# Patient Record
Sex: FEMALE | Race: WHITE | NOT HISPANIC OR LATINO
[De-identification: names, ages, dates, MRNs, and addresses within clinical notes are randomized per-mention and may not be internally consistent; named-entity substitution may affect disease eponyms.]

---

## 2021-03-09 ENCOUNTER — NON-APPOINTMENT (OUTPATIENT)
Age: 51
End: 2021-03-09

## 2021-03-10 ENCOUNTER — NON-APPOINTMENT (OUTPATIENT)
Age: 51
End: 2021-03-10

## 2021-03-11 ENCOUNTER — NON-APPOINTMENT (OUTPATIENT)
Age: 51
End: 2021-03-11

## 2021-03-11 ENCOUNTER — APPOINTMENT (OUTPATIENT)
Dept: CARDIOLOGY | Facility: CLINIC | Age: 51
End: 2021-03-11
Payer: COMMERCIAL

## 2021-03-11 VITALS
OXYGEN SATURATION: 97 % | DIASTOLIC BLOOD PRESSURE: 78 MMHG | WEIGHT: 256 LBS | TEMPERATURE: 98 F | HEART RATE: 87 BPM | SYSTOLIC BLOOD PRESSURE: 146 MMHG

## 2021-03-11 DIAGNOSIS — Z86.79 PERSONAL HISTORY OF OTHER DISEASES OF THE CIRCULATORY SYSTEM: ICD-10-CM

## 2021-03-11 DIAGNOSIS — J38.1 POLYP OF VOCAL CORD AND LARYNX: ICD-10-CM

## 2021-03-11 DIAGNOSIS — Z87.828 PERSONAL HISTORY OF OTHER (HEALED) PHYSICAL INJURY AND TRAUMA: ICD-10-CM

## 2021-03-11 DIAGNOSIS — Z83.49 FAMILY HISTORY OF OTHER ENDOCRINE, NUTRITIONAL AND METABOLIC DISEASES: ICD-10-CM

## 2021-03-11 DIAGNOSIS — Z86.711 PERSONAL HISTORY OF PULMONARY EMBOLISM: ICD-10-CM

## 2021-03-11 DIAGNOSIS — Z86.39 PERSONAL HISTORY OF OTHER ENDOCRINE, NUTRITIONAL AND METABOLIC DISEASE: ICD-10-CM

## 2021-03-11 DIAGNOSIS — Z82.49 FAMILY HISTORY OF ISCHEMIC HEART DISEASE AND OTHER DISEASES OF THE CIRCULATORY SYSTEM: ICD-10-CM

## 2021-03-11 DIAGNOSIS — Z86.69 PERSONAL HISTORY OF OTHER DISEASES OF THE NERVOUS SYSTEM AND SENSE ORGANS: ICD-10-CM

## 2021-03-11 DIAGNOSIS — E53.8 DEFICIENCY OF OTHER SPECIFIED B GROUP VITAMINS: ICD-10-CM

## 2021-03-11 DIAGNOSIS — Z86.19 PERSONAL HISTORY OF OTHER INFECTIOUS AND PARASITIC DISEASES: ICD-10-CM

## 2021-03-11 DIAGNOSIS — Z80.42 FAMILY HISTORY OF MALIGNANT NEOPLASM OF PROSTATE: ICD-10-CM

## 2021-03-11 PROCEDURE — 99072 ADDL SUPL MATRL&STAF TM PHE: CPT

## 2021-03-11 PROCEDURE — 93000 ELECTROCARDIOGRAM COMPLETE: CPT

## 2021-03-11 PROCEDURE — 99244 OFF/OP CNSLTJ NEW/EST MOD 40: CPT

## 2021-03-11 RX ORDER — GLUCOSAMINE/CHONDR SU A SOD 750-600 MG
TABLET ORAL
Refills: 0 | Status: ACTIVE | COMMUNITY

## 2021-03-13 PROBLEM — Z82.49 FAMILY HISTORY OF HYPERTENSION: Status: ACTIVE | Noted: 2021-03-13

## 2021-03-13 PROBLEM — Z83.49 FAMILY HISTORY OF HASHIMOTO THYROIDITIS: Status: ACTIVE | Noted: 2021-03-13

## 2021-03-13 PROBLEM — Z82.49 FAMILY HISTORY OF CONGESTIVE HEART FAILURE: Status: ACTIVE | Noted: 2021-03-13

## 2021-03-13 PROBLEM — Z86.19 HISTORY OF CANDIDIASIS OF MOUTH: Status: RESOLVED | Noted: 2021-03-13 | Resolved: 2021-03-13

## 2021-03-13 PROBLEM — Z86.69 HISTORY OF CARPAL TUNNEL SYNDROME: Status: RESOLVED | Noted: 2021-03-13 | Resolved: 2021-03-13

## 2021-03-13 PROBLEM — J38.1 VOCAL CORD POLYPS: Status: RESOLVED | Noted: 2021-03-13 | Resolved: 2021-03-13

## 2021-03-13 PROBLEM — Z80.42 FAMILY HISTORY OF MALIGNANT NEOPLASM OF PROSTATE: Status: ACTIVE | Noted: 2021-03-13

## 2021-03-13 RX ORDER — ASCORBIC ACID 500 MG
TABLET ORAL
Refills: 0 | Status: ACTIVE | COMMUNITY

## 2021-03-13 RX ORDER — CHROMIUM 200 MCG
TABLET ORAL
Refills: 0 | Status: ACTIVE | COMMUNITY

## 2021-03-13 NOTE — DISCUSSION/SUMMARY
[FreeTextEntry1] : Atrial fibrillation\par The working diagnosis is paroxysmal atrial fibrillation secondary to hypertensive-diabetic heart disease exacerbated by obesity. The history is consistent with this diagnosis. The present rate control strategy appears to be effective. An elevated "[id number]" score is indicative of an increased risk of systemic/cerebral emboli. Factor Xa. inhibitor therapy is indicated. Atrial fibrillation is expected to recur while taking the present medical regimen. The goal of treatment is control of the ventricular response and prevention of cardio embolic events.\par \par I have recommended the following\par a. Continue the present medical regimen\par b. No further cardiac testing for this problem at this time\par c. Consideration for a Zio patch/mobile telemetry study later 2021 or 2022\par \par \par Hypertension\par Hypertension has reportedly been controlled while taking the present medical regimen. In the setting of atrial fibrillation diabetes beta blocker and Ace-I./ARB therapy are attractive. The dose of nadolol  may be increased if required to maintain optimal levels.\par \par I have recommended the following\par a. Continue the present medical regimen\par b. Low salt low fat low cholesterol/triglycerides diabetic diet. Regular aerobic exercise and weight loss\par c. Home blood pressure monitoring\par d. Increased nadolol dose if required to maintain optimal levels as discussed above\par \par \par \par Hyperlipidemia\par Hyperlipidemia represents a risk factor for atherosclerotic heart disease. However there is no evidence of such to date. A stress test although not available for review at this time was reportedly normal approximately 5 years ago. The resting 3/21 electrocardiogram  showed no evidence of myocardial ischemia or infarction.. The target LDL level for primary prevention is about 100. The most recent lipid levels are not available for review. Nonpharmacological therapy specifically diet exercise and weight loss are  emphasized  as  major aspects of treatment.\par \par I have recommended the following\par a. Low salt low fat low cholesterol/triglyceride diet. Regular aerobic exercise and weight loss\par b. Continue the present medical regimen\par c. Target LDL level to about 100 as discussed above\par d. Anticipate routine laboratory studies including lipid profile through primary care Dr. Valdez in 4/21.\par \par \par Valvular heart disease\par Ms. Delgado carries a diagnosis of mitral valve prolapse in the past. However the 12/19 transthoracic echocardiogram demonstrated a structurally normal mitral valve. The left and ejection fraction 67%. The present cardiac physical examination is not suggestive of hemodynamic significant valvular pathology.\par \par I have recommended the following\par a. No further cardiac testing for this problem at this time\par \par \par \par Ventricular tachycardia\par Nonsustained ventricular tachycardia was reportedly seen on a 2014 Holter monitor. That study is not available for review. In the setting of normal left ventricular systolic function (left ventricle ejection fraction 67% 12/19 echocardiogram) the risk of the development of a malignant ventricular arrhythmia is low.\par \par I have recommended the following\par a. No further cardiac testing for this problem at this time\par b. Consideration for Zio patch/mobile telemetry study later 2020 one or 2022\par \par \par \par Obesity\par Obesity exacerbates carries cardiovascular issues. Today Ms. Delgado is 5 feet 7 inches tall and weighs 256 pounds. Diet exercise and weight loss are advised.\par \par \par The diagnosis, prognosis, risks, options and alternatives were explained at length to the patient. All questions were answered. Issues discussed included atrial fibrillation anticoagulation hypertension hyperlipidemia noninvasive cardiac testing antihypertensive agents antihyperlipidemic agents obesity diet exercise and weight loss.\par \par \par

## 2021-03-17 ENCOUNTER — TRANSCRIPTION ENCOUNTER (OUTPATIENT)
Age: 51
End: 2021-03-17

## 2021-03-18 ENCOUNTER — TRANSCRIPTION ENCOUNTER (OUTPATIENT)
Age: 51
End: 2021-03-18

## 2021-04-08 ENCOUNTER — APPOINTMENT (OUTPATIENT)
Dept: PULMONOLOGY | Facility: CLINIC | Age: 51
End: 2021-04-08

## 2021-04-12 ENCOUNTER — APPOINTMENT (OUTPATIENT)
Dept: PULMONOLOGY | Facility: CLINIC | Age: 51
End: 2021-04-12
Payer: COMMERCIAL

## 2021-04-12 VITALS
HEART RATE: 76 BPM | TEMPERATURE: 97 F | HEIGHT: 67 IN | SYSTOLIC BLOOD PRESSURE: 138 MMHG | WEIGHT: 251 LBS | BODY MASS INDEX: 39.39 KG/M2 | OXYGEN SATURATION: 97 % | DIASTOLIC BLOOD PRESSURE: 72 MMHG

## 2021-04-12 PROCEDURE — 99204 OFFICE O/P NEW MOD 45 MIN: CPT

## 2021-04-12 PROCEDURE — 99072 ADDL SUPL MATRL&STAF TM PHE: CPT

## 2021-04-12 NOTE — REVIEW OF SYSTEMS
[Recent Wt Loss (___ Lbs)] : ~T recent [unfilled] lb weight loss [Seasonal Allergies] : seasonal allergies [Nocturia] : nocturia [Anemia] : anemia [Obesity] : obesity [Negative] : Psychiatric [Fever] : no fever [Chills] : no chills [TextBox_30] : see HPI

## 2021-04-12 NOTE — PHYSICAL EXAM
[No Acute Distress] : no acute distress [Normal Oropharynx] : normal oropharynx [II] : Mallampati Class: II [Normal Appearance] : normal appearance [Neck Circumference: ___] : neck circumference: [unfilled] [No Neck Mass] : no neck mass [Normal Rate/Rhythm] : normal rate/rhythm [Normal S1, S2] : normal s1, s2 [No Resp Distress] : no resp distress [No Acc Muscle Use] : no acc muscle use [Clear to Auscultation Bilaterally] : clear to auscultation bilaterally [No Abnormalities] : no abnormalities [Benign] : benign [Normal Gait] : normal gait [No Clubbing] : no clubbing [No Cyanosis] : no cyanosis [No Edema] : no edema [Normal Turgor] : normal turgor [No Focal Deficits] : no focal deficits [Oriented x3] : oriented x3 [Normal Affect] : normal affect

## 2021-04-16 ENCOUNTER — TRANSCRIPTION ENCOUNTER (OUTPATIENT)
Age: 51
End: 2021-04-16

## 2021-04-19 ENCOUNTER — APPOINTMENT (OUTPATIENT)
Dept: INTERNAL MEDICINE | Facility: CLINIC | Age: 51
End: 2021-04-19
Payer: COMMERCIAL

## 2021-04-19 VITALS — SYSTOLIC BLOOD PRESSURE: 135 MMHG | DIASTOLIC BLOOD PRESSURE: 75 MMHG

## 2021-04-19 VITALS — HEIGHT: 67 IN | TEMPERATURE: 98 F | WEIGHT: 251 LBS | BODY MASS INDEX: 39.39 KG/M2

## 2021-04-19 DIAGNOSIS — K59.00 CONSTIPATION, UNSPECIFIED: ICD-10-CM

## 2021-04-19 DIAGNOSIS — N92.0 EXCESSIVE AND FREQUENT MENSTRUATION WITH REGULAR CYCLE: ICD-10-CM

## 2021-04-19 DIAGNOSIS — J30.9 ALLERGIC RHINITIS, UNSPECIFIED: ICD-10-CM

## 2021-04-19 PROCEDURE — 99072 ADDL SUPL MATRL&STAF TM PHE: CPT

## 2021-04-19 PROCEDURE — G0444 DEPRESSION SCREEN ANNUAL: CPT

## 2021-04-19 PROCEDURE — 99386 PREV VISIT NEW AGE 40-64: CPT | Mod: 25

## 2021-04-19 PROCEDURE — 36415 COLL VENOUS BLD VENIPUNCTURE: CPT

## 2021-04-19 NOTE — HISTORY OF PRESENT ILLNESS
[FreeTextEntry1] : physical [de-identified] : Patient is a 50F with a history of Ham Syndrome (ITP and AIHA), Hypogammaglobulinemia, Hypertension, paroxysmal atrial fibrillation, Asthma, hypertriglyceridemia, Hashimoto's thyroiditis, mild AQUILES, nephrolithiasis presents today to establish care and for annual physical\par Recently moved to [facility] from [place], currently in temporary housing until new construction is ready in october\par Moving most of her care to [place]\par \par Hematology:  Dr. Indy Esquivel @[facility]\par Immunology:  Dr. Aliya Vazquez ([facility])\par Gynecology: Dr. Lacie Santamaria [facility] ([place]) PAP 6/2020, Mammo 2019 NEXT in July 2021\par Weight loss specialist: Dr. Meka Renae ([facility])\par Ophthalmology: Dr. Constance Hylton [facility], would like ophthalmology referral\par Urology: Dr. Kyle Cartagena - sees every 6 month for xrays, would like urology referral\par Dermatology: Dr. Kyle James [facility], would like dermatology referral\par Pulmonology: Dr. Sanchez \par Cardiology: Dr. Arshad\par Ortho: Dr. Shen Zuñiga\par \par \par \par \par \par \par

## 2021-04-19 NOTE — HEALTH RISK ASSESSMENT
[Yes] : Yes [Monthly or less (1 pt)] : Monthly or less (1 point) [1 or 2 (0 pts)] : 1 or 2 (0 points) [Never (0 pts)] : Never (0 points) [No] : In the past 12 months have you used drugs other than those required for medical reasons? No [No falls in past year] : Patient reported no falls in the past year [0] : 2) Feeling down, depressed, or hopeless: Not at all (0) [Patient reported mammogram was normal] : Patient reported mammogram was normal [Patient reported PAP Smear was normal] : Patient reported PAP Smear was normal [HIV test declined] : HIV test declined [Hepatitis C test offered] : Hepatitis C test offered [Alone] : lives alone [# of Members in Household ___] :  household currently consist of [unfilled] member(s) [Retired] : retired [Graduate School] : graduate school [Single] : single [# Of Children ___] : has [unfilled] children [Feels Safe at Home] : Feels safe at home [Reports normal functional visual acuity (ie: able to read med bottle)] : Reports normal functional visual acuity [Smoke Detector] : smoke detector [Carbon Monoxide Detector] : carbon monoxide detector [Seat Belt] :  uses seat belt [Sunscreen] : uses sunscreen [With Patient/Caregiver] : With Patient/Caregiver [Patient reported colonoscopy was normal] : Patient reported colonoscopy was normal [de-identified] : rare [] : No [Audit-CScore] : 1 [de-identified] : elliptical  4-5 x week ,walks  [de-identified] : well balanced  [[id number]] : 0 [Reports changes in hearing] : Reports no changes in hearing [Reports changes in vision] : Reports no changes in vision [Reports changes in dental health] : Reports no changes in dental health [Guns at Home] : no guns at home [Safety elements used in home] : no safety elements used in home [Travel to Developing Areas] : does not  travel to developing areas [Caregiver Concerns] : does not have caregiver concerns [MammogramDate] : 7/3/2019 [MammogramComments] : [facility] [PapSmearDate] : 7/30/2020 [BoneDensityDate] : never [PapSmearComments] : Dr. Lacie Santamaria [ColonoscopyDate] : 02/2020 [FreeTextEntry2] :  (self employed) [de-identified] : has cleaning 4/20/21 [de-identified] : last exam 10/2020 [AdvancecareDate] : 4/2021

## 2021-04-20 ENCOUNTER — TRANSCRIPTION ENCOUNTER (OUTPATIENT)
Age: 51
End: 2021-04-20

## 2021-04-20 PROBLEM — N92.0 MENORRHAGIA: Status: ACTIVE | Noted: 2021-04-20

## 2021-04-20 PROBLEM — K59.00 CONSTIPATION: Status: ACTIVE | Noted: 2021-04-20

## 2021-04-20 LAB
25(OH)D3 SERPL-MCNC: 35.5 NG/ML
ALBUMIN SERPL ELPH-MCNC: 4.6 G/DL
ALP BLD-CCNC: 56 U/L
ALT SERPL-CCNC: 22 U/L
ANION GAP SERPL CALC-SCNC: 11 MMOL/L
AST SERPL-CCNC: 27 U/L
BASOPHILS # BLD AUTO: 0.11 K/UL
BASOPHILS NFR BLD AUTO: 1.1 %
BILIRUB SERPL-MCNC: 0.3 MG/DL
BUN SERPL-MCNC: 13 MG/DL
CALCIUM SERPL-MCNC: 10.5 MG/DL
CHLORIDE SERPL-SCNC: 103 MMOL/L
CHOLEST SERPL-MCNC: 163 MG/DL
CO2 SERPL-SCNC: 24 MMOL/L
COVID-19 NUCLEOCAPSID  GAM ANTIBODY INTERPRETATION: NEGATIVE
COVID-19 SPIKE DOMAIN ANTIBODY INTERPRETATION: POSITIVE
CREAT SERPL-MCNC: 0.75 MG/DL
EOSINOPHIL # BLD AUTO: 0.48 K/UL
EOSINOPHIL NFR BLD AUTO: 4.9 %
ESTIMATED AVERAGE GLUCOSE: 97 MG/DL
GLUCOSE SERPL-MCNC: 77 MG/DL
HBA1C MFR BLD HPLC: 5 %
HCT VFR BLD CALC: 40.8 %
HDLC SERPL-MCNC: 48 MG/DL
HGB BLD-MCNC: 12.7 G/DL
IMM GRANULOCYTES NFR BLD AUTO: 0.3 %
LDLC SERPL CALC-MCNC: 89 MG/DL
LYMPHOCYTES # BLD AUTO: 2.29 K/UL
LYMPHOCYTES NFR BLD AUTO: 23.2 %
MAN DIFF?: NORMAL
MCHC RBC-ENTMCNC: 29.2 PG
MCHC RBC-ENTMCNC: 31.1 GM/DL
MCV RBC AUTO: 93.8 FL
MONOCYTES # BLD AUTO: 0.85 K/UL
MONOCYTES NFR BLD AUTO: 8.6 %
NEUTROPHILS # BLD AUTO: 6.1 K/UL
NEUTROPHILS NFR BLD AUTO: 61.9 %
NONHDLC SERPL-MCNC: 116 MG/DL
PLATELET # BLD AUTO: 420 K/UL
POTASSIUM SERPL-SCNC: 4.3 MMOL/L
PROT SERPL-MCNC: 6.8 G/DL
RBC # BLD: 4.35 M/UL
RBC # FLD: 14.1 %
SARS-COV-2 AB SERPL IA-ACNC: >250 U/ML
SARS-COV-2 AB SERPL QL IA: 0.1 INDEX
SODIUM SERPL-SCNC: 139 MMOL/L
T3FREE SERPL-MCNC: 2.73 PG/ML
T4 FREE SERPL-MCNC: 1.6 NG/DL
THYROGLOB AB SERPL-ACNC: <20 IU/ML
THYROPEROXIDASE AB SERPL IA-ACNC: 87.5 IU/ML
TRIGL SERPL-MCNC: 135 MG/DL
TSH SERPL-ACNC: 2.22 UIU/ML
WBC # FLD AUTO: 9.86 K/UL

## 2021-04-20 RX ORDER — DOCUSATE SODIUM 100 MG
100 TABLET ORAL DAILY
Qty: 90 | Refills: 3 | Status: ACTIVE | COMMUNITY
Start: 2021-04-20

## 2021-04-20 RX ORDER — PYRIDOXINE HCL (VITAMIN B6) 100 MG
100 TABLET ORAL TWICE DAILY
Refills: 0 | Status: ACTIVE | COMMUNITY

## 2021-04-20 RX ORDER — ALBUTEROL SULFATE 90 UG/1
108 AEROSOL, METERED RESPIRATORY (INHALATION)
Refills: 0 | Status: DISCONTINUED | COMMUNITY
End: 2021-04-20

## 2021-04-20 RX ORDER — BECLOMETHASONE DIPROPIONATE HFA 80 UG/1
80 AEROSOL, METERED RESPIRATORY (INHALATION)
Refills: 0 | Status: DISCONTINUED | COMMUNITY
End: 2021-04-20

## 2021-04-20 RX ORDER — IMMUNE GLOBULIN SUBCUTANEOUS, HUMAN-KLHW 200 MG/ML
2 SOLUTION SUBCUTANEOUS WEEKLY
Refills: 0 | Status: ACTIVE | COMMUNITY
Start: 2021-02-16

## 2021-04-20 RX ORDER — FLUTICASONE PROPIONATE 50 UG/1
50 SPRAY, METERED NASAL TWICE DAILY
Qty: 1 | Refills: 6 | Status: ACTIVE | COMMUNITY
Start: 2021-04-20

## 2021-04-20 RX ORDER — LORATADINE 10 MG/1
10 CAPSULE, LIQUID FILLED ORAL DAILY
Qty: 90 | Refills: 3 | Status: ACTIVE | COMMUNITY
Start: 2021-04-20

## 2021-04-20 RX ORDER — OMEGA-3/DHA/EPA/FISH OIL 35-113.5MG
1000 TABLET,CHEWABLE ORAL DAILY
Refills: 0 | Status: ACTIVE | COMMUNITY

## 2021-04-20 RX ORDER — FENOFIBRATE 150 MG/1
150 CAPSULE ORAL
Refills: 0 | Status: DISCONTINUED | COMMUNITY
End: 2021-04-20

## 2021-04-21 ENCOUNTER — TRANSCRIPTION ENCOUNTER (OUTPATIENT)
Age: 51
End: 2021-04-21

## 2021-05-05 ENCOUNTER — TRANSCRIPTION ENCOUNTER (OUTPATIENT)
Age: 51
End: 2021-05-05
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
## 2021-05-27 ENCOUNTER — TRANSCRIPTION ENCOUNTER (OUTPATIENT)
Age: 51
End: 2021-05-27

## 2021-06-14 ENCOUNTER — APPOINTMENT (OUTPATIENT)
Dept: PULMONOLOGY | Facility: CLINIC | Age: 51
End: 2021-06-14

## 2021-06-16 ENCOUNTER — NON-APPOINTMENT (OUTPATIENT)
Age: 51
End: 2021-06-16

## 2021-06-17 ENCOUNTER — NON-APPOINTMENT (OUTPATIENT)
Age: 51
End: 2021-06-17

## 2021-06-17 ENCOUNTER — APPOINTMENT (OUTPATIENT)
Dept: GASTROENTEROLOGY | Facility: CLINIC | Age: 51
End: 2021-06-17
Payer: COMMERCIAL

## 2021-06-17 VITALS
BODY MASS INDEX: 39.24 KG/M2 | HEART RATE: 74 BPM | HEIGHT: 67 IN | TEMPERATURE: 97.7 F | SYSTOLIC BLOOD PRESSURE: 128 MMHG | DIASTOLIC BLOOD PRESSURE: 86 MMHG | WEIGHT: 250 LBS | OXYGEN SATURATION: 98 %

## 2021-06-17 PROCEDURE — 99072 ADDL SUPL MATRL&STAF TM PHE: CPT

## 2021-06-17 PROCEDURE — 99243 OFF/OP CNSLTJ NEW/EST LOW 30: CPT

## 2021-06-17 RX ORDER — OMEGA-3/DHA/EPA/FISH OIL 300-1000MG
CAPSULE ORAL
Refills: 0 | Status: DISCONTINUED | COMMUNITY
End: 2021-06-17

## 2021-06-17 RX ORDER — DOXYCYCLINE 100 MG/1
100 CAPSULE ORAL
Qty: 2 | Refills: 0 | Status: DISCONTINUED | COMMUNITY
Start: 2021-05-24 | End: 2021-06-17

## 2021-06-17 NOTE — REASON FOR VISIT
[Consultation] : a consultation visit [FreeTextEntry1] : Patient seen at the request of Dr. Demi Valdez for the evaluation of colon cancer screening.

## 2021-06-17 NOTE — HISTORY OF PRESENT ILLNESS
[FreeTextEntry1] : 50 year old F PMH Ham Sx- ITP and AIHA, hypogammaglobulinemia/h/o multiple infections, SC IVIG, htn, h/o PE,  pAfib on Xarelto, asthma, hyperTAG, Hashimoto's thyroiditis, mild AQUILES, h/o nephrolithiasis , abnormal uterine bleeding, s/p D&C 2015, ortho surgeries. hyper PTH, s/p parathyroidectomy, presents for evaluation of colon cancer screening. \par \par She sees multiple physician- Hematology, immunology, gyn, weight loss specialist, Ophtho, urology, derm, pulmonary, cardiology, ortho\par \par Negative cologuard  02/2020\par \par No prior colonoscopy\par \par Patient denies abdominal pain , n/v/heartburn, reflux, dysphagia/odynophagia, change in bowel habits, diarrhea, constipation, brbpr, melena. no weight loss.  Good appetite. Patient has daily BM, denies regular NSAID use. \par \par normal CBC/CMET 04/2021\par \par father- h/o colon polyps, nonadenomatous\par mother- no known polyps\par \par no known colon cancer

## 2021-06-17 NOTE — PHYSICAL EXAM
[General Appearance - Alert] : alert [General Appearance - In No Acute Distress] : in no acute distress [Sclera] : the sclera and conjunctiva were normal [Outer Ear] : the ears and nose were normal in appearance [Hearing Threshold Finger Rub Not [place]] : hearing was normal [Neck Appearance] : the appearance of the neck was normal [] : no respiratory distress [Apical Impulse] : the apical impulse was normal [Abdomen Soft] : soft [Abdomen Tenderness] : non-tender [Abnormal Walk] : normal gait [Skin Color & Pigmentation] : normal skin color and pigmentation [Oriented To Time, Place, And Person] : oriented to person, place, and time [FreeTextEntry1] : deferred

## 2021-06-17 NOTE — ASSESSMENT
[FreeTextEntry1] : Colon cancer screening\par -Negative cologuard test in 02/2020. \par -Discussed cologuard test results in detail. Discussed risks (including but not limited to sedation risks, infection, bleeding, perforation, possibility of missed lesions),  benefits and alternatives to colonoscopy, including not doing the procedure.\par -patient understood and agreed to repeat cologuard testing in 02/2023. \par -she will call if any questions or concerns prior to this time. \par

## 2021-08-30 ENCOUNTER — APPOINTMENT (OUTPATIENT)
Dept: PULMONOLOGY | Facility: CLINIC | Age: 51
End: 2021-08-30
Payer: COMMERCIAL

## 2021-08-30 VITALS
OXYGEN SATURATION: 98 % | BODY MASS INDEX: 39.24 KG/M2 | TEMPERATURE: 98 F | WEIGHT: 250 LBS | HEIGHT: 67 IN | HEART RATE: 75 BPM | SYSTOLIC BLOOD PRESSURE: 140 MMHG | DIASTOLIC BLOOD PRESSURE: 90 MMHG

## 2021-08-30 PROCEDURE — 99214 OFFICE O/P EST MOD 30 MIN: CPT

## 2021-08-30 NOTE — REVIEW OF SYSTEMS
[Seasonal Allergies] : seasonal allergies [Nocturia] : nocturia [Obesity] : obesity [Negative] : Neurologic

## 2021-08-30 NOTE — HISTORY OF PRESENT ILLNESS
[TextBox_4] : 50 year old female with asthma and AQUILES came for f/u.\par Last seen in April 2021\par She is on Auto PAP 4-20 cm h2O\par Compliance report\par Usage 77%\par Average use 4 hrs 56 min\par 95% pressure 6.5\par Leak 6\par AHI 1\par \par She is using Q kris only 1 inh daily with no symptoms.  No exacerbations.\par Does not use Albuterol as all. No night symptoms.\par \par She uses CPAP almost every night.\par Does not feels any difference in sleep with PAP.\par She sleeps well with PAP.\par Has nocturia once per night.\par Has a good mask fit. Has a face mask. No leaks.\par \par \par \par

## 2021-08-30 NOTE — PHYSICAL EXAM
[No Acute Distress] : no acute distress [Normal Rate/Rhythm] : normal rate/rhythm [Normal Appearance] : normal appearance [Normal S1, S2] : normal s1, s2 [No Resp Distress] : no resp distress [Clear to Auscultation Bilaterally] : clear to auscultation bilaterally [No Abnormalities] : no abnormalities [No Cyanosis] : no cyanosis [No Clubbing] : no clubbing [No Focal Deficits] : no focal deficits [Oriented x3] : oriented x3 [Normal Affect] : normal affect

## 2021-09-09 ENCOUNTER — APPOINTMENT (OUTPATIENT)
Dept: CARDIOLOGY | Facility: CLINIC | Age: 51
End: 2021-09-09
Payer: COMMERCIAL

## 2021-09-09 VITALS
BODY MASS INDEX: 39.24 KG/M2 | HEART RATE: 78 BPM | SYSTOLIC BLOOD PRESSURE: 149 MMHG | TEMPERATURE: 98.6 F | WEIGHT: 250 LBS | DIASTOLIC BLOOD PRESSURE: 82 MMHG | HEIGHT: 67 IN | OXYGEN SATURATION: 98 %

## 2021-09-09 DIAGNOSIS — Z87.09 PERSONAL HISTORY OF OTHER DISEASES OF THE RESPIRATORY SYSTEM: ICD-10-CM

## 2021-09-09 DIAGNOSIS — Z86.39 PERSONAL HISTORY OF OTHER ENDOCRINE, NUTRITIONAL AND METABOLIC DISEASE: ICD-10-CM

## 2021-09-09 DIAGNOSIS — Z87.442 PERSONAL HISTORY OF URINARY CALCULI: ICD-10-CM

## 2021-09-09 DIAGNOSIS — Z86.69 PERSONAL HISTORY OF OTHER DISEASES OF THE NERVOUS SYSTEM AND SENSE ORGANS: ICD-10-CM

## 2021-09-09 DIAGNOSIS — Z86.2 PERSONAL HISTORY OF DISEASES OF THE BLOOD AND BLOOD-FORMING ORGANS AND CERTAIN DISORDERS INVOLVING THE IMMUNE MECHANISM: ICD-10-CM

## 2021-09-09 DIAGNOSIS — M72.2 PLANTAR FASCIAL FIBROMATOSIS: ICD-10-CM

## 2021-09-09 DIAGNOSIS — Z86.79 PERSONAL HISTORY OF OTHER DISEASES OF THE CIRCULATORY SYSTEM: ICD-10-CM

## 2021-09-09 PROCEDURE — 93000 ELECTROCARDIOGRAM COMPLETE: CPT

## 2021-09-09 PROCEDURE — 99214 OFFICE O/P EST MOD 30 MIN: CPT

## 2021-09-10 ENCOUNTER — NON-APPOINTMENT (OUTPATIENT)
Age: 51
End: 2021-09-10

## 2021-09-10 PROBLEM — Z86.39 HISTORY OF HYPOTHYROIDISM: Status: RESOLVED | Noted: 2021-09-10 | Resolved: 2021-09-10

## 2021-09-10 PROBLEM — Z86.2 HISTORY OF HEMOLYTIC ANEMIA: Status: RESOLVED | Noted: 2021-09-10 | Resolved: 2021-09-10

## 2021-09-10 PROBLEM — Z86.39 HISTORY OF OBESITY: Status: RESOLVED | Noted: 2021-09-10 | Resolved: 2021-09-10

## 2021-09-10 PROBLEM — Z86.79 HISTORY OF HYPERTENSION: Status: RESOLVED | Noted: 2021-09-10 | Resolved: 2021-09-10

## 2021-09-10 PROBLEM — Z87.442 HISTORY OF KIDNEY STONES: Status: RESOLVED | Noted: 2021-09-10 | Resolved: 2021-09-10

## 2021-09-10 PROBLEM — Z87.09 HISTORY OF ASTHMA: Status: RESOLVED | Noted: 2021-09-10 | Resolved: 2021-09-10

## 2021-09-10 PROBLEM — Z86.69 HISTORY OF SLEEP APNEA: Status: RESOLVED | Noted: 2021-09-10 | Resolved: 2021-09-10

## 2021-09-10 PROBLEM — M72.2 PLANTAR FASCIITIS: Status: RESOLVED | Noted: 2021-03-13 | Resolved: 2021-09-10

## 2021-09-10 PROBLEM — Z86.2 HISTORY OF IDIOPATHIC THROMBOCYTOPENIC PURPURA: Status: RESOLVED | Noted: 2021-09-10 | Resolved: 2021-09-10

## 2021-09-10 NOTE — HISTORY OF PRESENT ILLNESS
[FreeTextEntry1] : 50-year-old female\par Routine followup\par "I feel okay."  Patricia denies chest pain, sustained palpitations shortness of breath at rest or syncope. She is physically active exercising regularly without restriction or limitation. Occasional fleeting extrasystoles.

## 2021-09-10 NOTE — REVIEW OF SYSTEMS
[Joint Pain] : joint pain [Negative] : Psychiatric [FreeTextEntry5] : see history of present illness

## 2021-09-10 NOTE — DISCUSSION/SUMMARY
[FreeTextEntry1] : Atrial fibrillation\par The working diagnosis is paroxysmal atrial fibrillation secondary to hypertensive heart disease exacerbated by obesity. The history is consistent with this diagnosis. The present rate control strategy appears to be effective. An elevated "[id number]" score is indicative of an increased risk of systemic/cerebral emboli. Factor Xa. inhibitor therapy is indicated. Atrial fibrillation is expected to recur while taking the present medical regimen. The goal of treatment is control of the ventricular response and prevention of cardio embolic events.\par \par I have recommended the following\par a. Continue the present medical regimen\par b. No further cardiac testing for this problem at this time\par c. Consideration for a Zio patch/mobile telemetry study   2022\par \par \par \par \par Hypertension\par Hypertension has reportedly been controlled while taking the present medical regimen. In the setting of atrial fibrillation diabetes beta blocker and Ace-I./ARB therapy are attractive. The dose of nadolol  may be increased if required to maintain optimal levels.\par \par I have recommended the following\par a. Continue the present medical regimen\par b. Low salt low fat low cholesterol/triglycerides diabetic diet. Regular aerobic exercise and weight loss\par c. Home blood pressure monitoring\par d. Increase  nadolol dose if required to maintain optimal levels as discussed above\par \par \par \par Hyperlipidemia\par Hyperlipidemia represents a risk factor for atherosclerotic heart disease. However there is no evidence of such to date. A stress test echocardiogram in 4/14 was normal . The resting 9/21 electrocardiogram  showed no evidence of myocardial ischemia or infarction.. The target LDL level for primary prevention is about 100.   The present medical regimen has been effective. In 4/21 serum cholesterol level was 163 triglycerides 135 HDL 48 and LDL 89. Nonpharmacological therapy specifically diet exercise and weight loss are  emphasized  as  major aspects of treatment.\par \par I have recommended the following\par a. Low salt low fat low cholesterol/triglyceride diet. Regular aerobic exercise and weight loss\par b. Continue the present medical regimen\par c. Target LDL level to about 100 as discussed above\par d  Stress treadmill ECG study\par \par \par \par Valvular heart disease\par Ms. Delgado carries a diagnosis of mitral valve prolapse in the past. However the 12/19 transthoracic echocardiogram demonstrated a structurally normal mitral valve. The left and ejection fraction 67%. The present cardiac physical examination is not suggestive of hemodynamic significant valvular pathology.\par Echocardiography would be helpful to reassess left ventricular and valvular function.\par \par I have recommended the following\par a. Echocardiogram\par \par \par \par Ventricular tachycardia\par Nonsustained ventricular tachycardia was reportedly seen on a 2014 Holter monitor. That study is not available for review. In the setting of normal left ventricular systolic function (left ventricle ejection fraction 67% 12/19 echocardiogram) the risk of the development of a malignant ventricular arrhythmia is low.\par \par I have recommended the following\par a.  echocardiogram\par b. consideration for Zio patch/mobile telemetry study  2022\par \par \par \par Obesity\par Obesity exacerbates carries cardiovascular issues. Today Ms. Delgado is 5 feet 7 inches tall and weighs 250 pounds. Diet exercise and weight loss are advised.\par \par \par The diagnosis, prognosis, risks, options and alternatives were explained at length to the patient. All questions were answered. Issues discussed included atrial fibrillation anticoagulation hypertension hyperlipidemia noninvasive cardiac testing antihypertensive agents antihyperlipidemic agents obesity diet exercise and weight loss.\par \par \par Counseling and/or coordination of care\par Time was a significant factor for this patient encounter. Total time spent with the patient was 30 minutes. 50% of the time was devoted to counseling and/or coordination of care\par \par

## 2021-09-28 ENCOUNTER — APPOINTMENT (OUTPATIENT)
Dept: CARDIOLOGY | Facility: CLINIC | Age: 51
End: 2021-09-28

## 2021-10-01 ENCOUNTER — APPOINTMENT (OUTPATIENT)
Dept: FAMILY MEDICINE | Facility: CLINIC | Age: 51
End: 2021-10-01
Payer: COMMERCIAL

## 2021-10-01 VITALS
HEIGHT: 67 IN | TEMPERATURE: 98.2 F | HEART RATE: 83 BPM | SYSTOLIC BLOOD PRESSURE: 138 MMHG | RESPIRATION RATE: 16 BRPM | WEIGHT: 250 LBS | BODY MASS INDEX: 39.24 KG/M2 | OXYGEN SATURATION: 98 % | DIASTOLIC BLOOD PRESSURE: 74 MMHG

## 2021-10-01 DIAGNOSIS — Z12.39 ENCOUNTER FOR OTHER SCREENING FOR MALIGNANT NEOPLASM OF BREAST: ICD-10-CM

## 2021-10-01 DIAGNOSIS — B37.2 CANDIDIASIS OF SKIN AND NAIL: ICD-10-CM

## 2021-10-01 DIAGNOSIS — Z11.59 ENCOUNTER FOR SCREENING FOR OTHER VIRAL DISEASES: ICD-10-CM

## 2021-10-01 PROCEDURE — 99213 OFFICE O/P EST LOW 20 MIN: CPT

## 2021-10-04 PROBLEM — Z12.39 SCREENING FOR BREAST CANCER: Status: RESOLVED | Noted: 2021-04-19 | Resolved: 2021-10-04

## 2021-10-04 PROBLEM — B37.2 CANDIDAL INTERTRIGO: Status: ACTIVE | Noted: 2021-10-01

## 2021-10-04 NOTE — HISTORY OF PRESENT ILLNESS
[FreeTextEntry8] : Pt is a 49 y/o F that presents to clinic c/o rash under her breasts and abdominal panus. Says it occurs intermittently but was much worse recently. She tried using over the counter steroid medications but it didn't help. It is itchy and slightly painful.

## 2021-10-04 NOTE — PHYSICAL EXAM
[Normal] : normal rate, regular rhythm, normal S1 and S2 and no murmur heard [de-identified] : erythematous macular rash with areas of sloughed off skin on abdominal pannus, underneath breasts and in groin folds.

## 2021-10-11 ENCOUNTER — APPOINTMENT (OUTPATIENT)
Dept: CARDIOLOGY | Facility: CLINIC | Age: 51
End: 2021-10-11

## 2021-10-17 ENCOUNTER — RESULT REVIEW (OUTPATIENT)
Age: 51
End: 2021-10-17

## 2021-10-20 ENCOUNTER — NON-APPOINTMENT (OUTPATIENT)
Age: 51
End: 2021-10-20

## 2021-11-15 ENCOUNTER — APPOINTMENT (OUTPATIENT)
Dept: PODIATRY | Facility: CLINIC | Age: 51
End: 2021-11-15
Payer: COMMERCIAL

## 2021-11-15 ENCOUNTER — NON-APPOINTMENT (OUTPATIENT)
Age: 51
End: 2021-11-15

## 2021-11-15 VITALS — HEIGHT: 67 IN | BODY MASS INDEX: 38.45 KG/M2 | WEIGHT: 245 LBS

## 2021-11-15 PROCEDURE — 99203 OFFICE O/P NEW LOW 30 MIN: CPT

## 2021-11-15 PROCEDURE — 73630 X-RAY EXAM OF FOOT: CPT | Mod: RT

## 2021-11-15 PROCEDURE — L3000: CPT | Mod: RT

## 2021-11-15 PROCEDURE — 73630 X-RAY EXAM OF FOOT: CPT | Mod: LT

## 2021-11-15 RX ORDER — NYSTATIN 100000 [USP'U]/G
100000 POWDER TOPICAL
Qty: 1 | Refills: 0 | Status: DISCONTINUED | COMMUNITY
Start: 2021-10-01 | End: 2021-11-15

## 2021-11-15 RX ORDER — KETOCONAZOLE 20 MG/G
2 CREAM TOPICAL TWICE DAILY
Qty: 1 | Refills: 0 | Status: DISCONTINUED | COMMUNITY
Start: 2021-10-01 | End: 2021-11-15

## 2021-11-15 RX ORDER — MULTIVITAMIN
TABLET ORAL
Refills: 0 | Status: DISCONTINUED | COMMUNITY
End: 2021-11-15

## 2021-11-15 NOTE — HISTORY OF PRESENT ILLNESS
[FreeTextEntry1] : Right foot\par Location: below the 2nd toe\par Duration: 3 months\par Acute: yes\par Chronic:\par Past Tx: none\par Exacerbated by:exercise\par Prior Hx:no\par \par Left Foot:\par Location: plantar aspect of the 1st met (Hx of left heel pain as well)\par Duration:3 months\par Acute:yes since walking more to lose weight\par Past Tx: none \par Exacerbated by: worse on days of exercise\par Prior Hx:\par \par

## 2021-11-15 NOTE — REVIEW OF SYSTEMS
[As Noted in HPI] : as noted in HPI [Easy Bleeding] : a tendency for easy bleeding [Easy Bruising] : a tendency for easy bruising [Negative] : Integumentary [Arthralgias] : no arthralgias [Joint Swelling] : no joint swelling [Joint Stiffness] : no joint stiffness [Limb Pain] : no limb pain [Limb Swelling] : no limb swelling [FreeTextEntry6] : Hx of PE,on AC

## 2021-11-15 NOTE — PHYSICAL EXAM
[General Appearance - Alert] : alert [General Appearance - In No Acute Distress] : in no acute distress [Sensation] : the sensory exam was normal to light touch and pinprick [No Focal Deficits] : no focal deficits [Deep Tendon Reflexes (DTR)] : deep tendon reflexes were 2+ and symmetric [Motor Exam] : the motor exam was normal [Oriented To Time, Place, And Person] : oriented to person, place, and time [Impaired Insight] : insight and judgment were intact [Affect] : the affect was normal [FreeTextEntry3] : Vascular exam reveals palpable pedal pulses, the foot is warm to touch, there was good capillary fill time, the skin is normal in appearance there is no evidence of vascular disease or compromise at this time [de-identified] : right foot\par s and sx c/w LMB, pain sub 2nd met head and MTP joint w/ flattening of 2nd met head\par left foot:\par s/p tibial sesamoidectomy, pain sub 1st met head area

## 2021-11-15 NOTE — PROCEDURE
[FreeTextEntry1] : X-rays were taken in the office multiple views right foot\par X-rays were taken in the office multiple views of the left foot\par s/p tibial sesamoidectomy left foot, early friebergs right\par \par had a lengthy discussion with the patient regarding the diagnosis etiology and differential diagnosis as well as treatment options for the presenting problem. Risks alternatives and benefits of treatment ranging from conservative to surgical explained in great detail. I also explained the progression of treatment from conservative to possible surgical treatment options as well as the benefits of each. I do stress conservative treatment if in fact conservative treatment is an option until it no longer provides relief. Over-the-counter products medications padding, and splinting were reviewed as well. All questions asked and answered appropriately to the patient's satisfaction\par \par I have applied offloading pads to the patient's foot and have given them several samples to continue to use. I have also advised the patient that they can certainly purchase these from an outside vendor and if they are willing to do that and the name and number was supplied\par \par Evaluation of her orthotics reveal a complete loss of support in the LA and rearfoot post breakdown/. In my opinion, she would benefit from a new pair of FFO.\par \par During the evaluation and management I had a lengthy discussion with the patient regarding benefits of functional foot orthoses. I explained to the patient the etiology and treatment options and one of them included the offloading and balancing of the painful portion of the foot. I explained the importance of balancing in offloading the painful area as part of the overall treatment process to advance healing. I have asked the patient to consider this as part of the treatment\par Will get insurance authorization\par \par I have applied offloading pads to the patient's foot and have given them several samples to continue to use. I have also advised the patient that they can certainly purchase these from an outside vendor and if they are willing to do that and the name and number was supplied\par \par The patient was advised formal physical therapy is critical at this point and that I recommend it in order to try and achieve best possible outcome to their problem. Rx has been supplied.\par \par follow up appt 4 weeks\par \par

## 2021-11-16 ENCOUNTER — TRANSCRIPTION ENCOUNTER (OUTPATIENT)
Age: 51
End: 2021-11-16

## 2021-11-17 ENCOUNTER — APPOINTMENT (OUTPATIENT)
Dept: BARIATRICS/WEIGHT MGMT | Facility: CLINIC | Age: 51
End: 2021-11-17
Payer: COMMERCIAL

## 2021-11-17 VITALS — WEIGHT: 245.8 LBS | BODY MASS INDEX: 38.5 KG/M2

## 2021-11-17 PROCEDURE — 99204 OFFICE O/P NEW MOD 45 MIN: CPT | Mod: 95

## 2021-11-17 NOTE — CONSULT LETTER
[Dear  ___] : Dear  [unfilled], [Consult Letter:] : I had the pleasure of evaluating your patient, [unfilled]. [( Thank you for referring [unfilled] for consultation for _____ )] : Thank you for referring [unfilled] for consultation for [unfilled] [Please see my note below.] : Please see my note below. [Consult Closing:] : Thank you very much for allowing me to participate in the care of this patient.  If you have any questions, please do not hesitate to contact me. [Sincerely,] : Sincerely, [FreeTextEntry3] : Areli Garsia FNP-BC

## 2021-11-17 NOTE — REASON FOR VISIT
[Other Location: e.g. School (Enter Location, City,State)___] : at [unfilled], at the time of the visit. [Other Location: e.g. Home (Enter Location, City,State)___] : at [unfilled] [Initial Consultation] : an initial consultation for

## 2021-11-17 NOTE — ASSESSMENT
[FreeTextEntry1] : 50 year old female with class II obesity c/b HTN, AQUILES, HPL\par Patient can continue current weight loss medications- discussed metformin and Ozempic are both off label as she does not have diabetes.  Refilled prescriptions for metformin ER 1000mg daily, Contrave 4 tablets/day, will increase Ozempic to 1mg/week- discussed possible side effects patient to call me if she feels unwell on higher dose\par Will start tracking in [place]- will refer to RD\par Committed to exercise 4-5x a week\par F/U 1 month

## 2021-11-17 NOTE — HISTORY OF PRESENT ILLNESS
[FreeTextEntry1] : 50 year old female referred for medical weight loss consultation.\par Patient has struggled with her weight since childhood.  Has tried many diets including WW, atkins.  Both parents struggle with their weight.  Her sister is an RD, MALVIN \yvon Was seeing Dr. Bryan at [place] for medical weight loss- currently taking metformin ER 500mg BID, ozempic 0.5mg/week (no known history of diabetes or prediabetes HbA1C 5.0 4/2021)\yvon Has treadmill and bike in her hotel room.  Going to live there for 1 year until construction on her new [employer] is done\yvon Lost her job at [employer] during the pandemic- going to be switching insurance soon\par Breakfast daily at [employer]- switched from bagels/waffles to yogurt and eggs\yvon Wears CPAP for AQUILES \par Highest weight 5 years ago 277, current weight 245.8\par Good water intake\yvon Struggling to meet people in [place]- doing [employer] events \par Immunocompromised- UTD with COVID vaccination \par Not interested in bariatric surgery

## 2021-11-24 ENCOUNTER — APPOINTMENT (OUTPATIENT)
Dept: CARDIOLOGY | Facility: CLINIC | Age: 51
End: 2021-11-24
Payer: COMMERCIAL

## 2021-11-24 ENCOUNTER — APPOINTMENT (OUTPATIENT)
Dept: INTERNAL MEDICINE | Facility: CLINIC | Age: 51
End: 2021-11-24
Payer: COMMERCIAL

## 2021-11-24 ENCOUNTER — MED ADMIN CHARGE (OUTPATIENT)
Age: 51
End: 2021-11-24

## 2021-11-24 PROCEDURE — 93015 CV STRESS TEST SUPVJ I&R: CPT

## 2021-11-24 PROCEDURE — 90686 IIV4 VACC NO PRSV 0.5 ML IM: CPT

## 2021-11-24 PROCEDURE — G0008: CPT

## 2021-12-09 ENCOUNTER — TRANSCRIPTION ENCOUNTER (OUTPATIENT)
Age: 51
End: 2021-12-09

## 2021-12-14 ENCOUNTER — TRANSCRIPTION ENCOUNTER (OUTPATIENT)
Age: 51
End: 2021-12-14

## 2021-12-15 ENCOUNTER — APPOINTMENT (OUTPATIENT)
Dept: BARIATRICS/WEIGHT MGMT | Facility: CLINIC | Age: 51
End: 2021-12-15
Payer: COMMERCIAL

## 2021-12-15 VITALS — BODY MASS INDEX: 37.71 KG/M2 | WEIGHT: 240.8 LBS

## 2021-12-15 PROCEDURE — 99213 OFFICE O/P EST LOW 20 MIN: CPT | Mod: 95

## 2021-12-15 NOTE — ASSESSMENT
[FreeTextEntry1] : 51 year old female with class II obesity\par Continue metformin 1000mg/day- restart Ozempic- titrate back up to 1mg/week- continue to monitor for GI side effects- continue adequate hydration\par Start tracking again in [employer]- f/u with RD\par Exercise-pending cardiology clearance\par F/U 1 month

## 2021-12-15 NOTE — HISTORY OF PRESENT ILLNESS
[FreeTextEntry1] : 50 year old female referred for medical weight loss consultation.\par Patient has struggled with her weight since childhood.  Has tried many diets including WW, atkins.  Both parents struggle with their weight.  Her sister is an RD, MALVIN \yvon Was seeing Dr. Bryan at [place] for medical weight loss- currently taking metformin ER 500mg BID, ozempic 0.5mg/week (no known history of diabetes or prediabetes HbA1C 5.0 4/2021)\yvon Has treadmill and bike in her hotel room.  Going to live there for 1 year until construction on her new [email or web address] is done\par Lost her job at [employer] during the pandemic- going to be switching insurance soon\par Breakfast daily at [facility]- switched from bagels/waffles to yogurt and eggs\par Wears CPAP for AQUILES \par Highest weight 5 years ago 277, current weight 245.8\par Good water intake\par Struggling to meet people in [place]- doing [employer] events \par Immunocompromised- UTD with COVID vaccination \par Not interested in bariatric surgery \par \par 12/15/21- Patient -4 pounds.  Had issue with nausea/vomiting (does not believe it was tied to the higher dose of Ozempic but d/brad the medication anyway).  Now reading to restart.  Had exercise stress test with abnormal results- nuclear stress test pending later this week.  Good hydration and sleep- wearing CPAP more.

## 2021-12-17 ENCOUNTER — APPOINTMENT (OUTPATIENT)
Dept: PODIATRY | Facility: CLINIC | Age: 51
End: 2021-12-17
Payer: COMMERCIAL

## 2021-12-17 VITALS — WEIGHT: 240 LBS | BODY MASS INDEX: 37.67 KG/M2 | HEIGHT: 67 IN

## 2021-12-17 PROCEDURE — 99213 OFFICE O/P EST LOW 20 MIN: CPT

## 2021-12-17 RX ORDER — LIDOCAINE AND PRILOCAINE 25; 25 MG/G; MG/G
2.5-2.5 CREAM TOPICAL
Qty: 30 | Refills: 0 | Status: ACTIVE | COMMUNITY
Start: 2021-11-09

## 2021-12-17 RX ORDER — SODIUM FLUORIDE 6 MG/ML
1.1 PASTE, DENTIFRICE DENTAL
Qty: 100 | Refills: 0 | Status: ACTIVE | COMMUNITY
Start: 2021-10-08

## 2021-12-17 RX ORDER — ACETAMINOPHEN 325 MG/1
325 TABLET ORAL
Qty: 24 | Refills: 0 | Status: ACTIVE | COMMUNITY
Start: 2021-11-05

## 2021-12-17 NOTE — HISTORY OF PRESENT ILLNESS
[FreeTextEntry1] : Right foot\par Location: LMB # 2\par Duration: 3 months:\par Past Tx: PT only and met pads (on AC) pads helped, PT hurt\par \par \par Left Foot:\par Location: plantar aspect of the 1st met (Hx of left heel pain as well)\par Duration:3 months\par pads helped\par \par The patient presents today for evaluation, fitting and dispensing of custom made foot orthotics\par

## 2021-12-17 NOTE — PHYSICAL EXAM
[Skin Color & Pigmentation] : normal skin color and pigmentation [Skin Turgor] : normal skin turgor [] : no rash [Skin Lesions] : no skin lesions [Sensation] : the sensory exam was normal to light touch and pinprick [No Focal Deficits] : no focal deficits [Deep Tendon Reflexes (DTR)] : deep tendon reflexes were 2+ and symmetric [Motor Exam] : the motor exam was normal [FreeTextEntry3] : Vascular exam reveals palpable pedal pulses, the foot is warm to touch, there was good capillary fill time, the skin is normal in appearance there is no evidence of vascular disease or compromise at this time [de-identified] : see above HPI [Foot Ulcer] : no foot ulcer [Skin Induration] : no skin induration

## 2021-12-17 NOTE — PROCEDURE
[FreeTextEntry1] : Orthotic Evaluation the orthotics have been evaluated and I do feel that there is a good fit to the patient's foot and they have been made to my specifications. \par          Clinical Notes: The patient presents for dispensing of custom molded foot orthotics. I have removed the orthotics from the packaging and I have examined him. They do appear to be made as per my instructions regarding materials additions corrections. Upon placing him to the patient's foot they do appear to fit nicely. There is no material failure nor gapping. They were then trimmed to fit and placed inside the patient's shoe gear. There appears to be a good fit. Upon initial ambulation the patient has no initial complaints regarding pain off edges were tight fit. The patient did ambulate around the office for several minutes and had a favorable result. I then explained to the patient the normal break-in period. The paperwork supplied by the laboratory was reviewed with the patient. They understand a normal break-in period is to be gradual over several weeks. I've advised the patient that different, weird, and uncomfortable are certainly acceptable words in the beginning however pain, blister and callus are things that should not occur. Once the proper break-in was explained to the patient they were given an appointment to follow up.\par \par Advised to speak to PT and leet them know she is having pain after Tx\par \par Advised tylenol\par \par follow up appt 4 weeks\par

## 2021-12-20 ENCOUNTER — RESULT REVIEW (OUTPATIENT)
Age: 51
End: 2021-12-20

## 2021-12-23 ENCOUNTER — APPOINTMENT (OUTPATIENT)
Dept: CARDIOLOGY | Facility: CLINIC | Age: 51
End: 2021-12-23
Payer: COMMERCIAL

## 2021-12-23 PROCEDURE — 99442: CPT

## 2022-01-10 ENCOUNTER — APPOINTMENT (OUTPATIENT)
Dept: BARIATRICS/WEIGHT MGMT | Facility: CLINIC | Age: 52
End: 2022-01-10

## 2022-01-21 ENCOUNTER — TRANSCRIPTION ENCOUNTER (OUTPATIENT)
Age: 52
End: 2022-01-21

## 2022-01-30 ENCOUNTER — RX RENEWAL (OUTPATIENT)
Age: 52
End: 2022-01-30

## 2022-02-02 ENCOUNTER — APPOINTMENT (OUTPATIENT)
Dept: CARDIOLOGY | Facility: CLINIC | Age: 52
End: 2022-02-02
Payer: COMMERCIAL

## 2022-02-02 PROCEDURE — 36415 COLL VENOUS BLD VENIPUNCTURE: CPT

## 2022-02-06 ENCOUNTER — NON-APPOINTMENT (OUTPATIENT)
Age: 52
End: 2022-02-06

## 2022-02-06 LAB
ALBUMIN SERPL ELPH-MCNC: 4.4 G/DL
ALP BLD-CCNC: 57 U/L
ALT SERPL-CCNC: 21 U/L
ANION GAP SERPL CALC-SCNC: 12 MMOL/L
AST SERPL-CCNC: 26 U/L
BASOPHILS # BLD AUTO: 0.15 K/UL
BASOPHILS NFR BLD AUTO: 2.3 %
BILIRUB SERPL-MCNC: 0.2 MG/DL
BUN SERPL-MCNC: 11 MG/DL
CALCIUM SERPL-MCNC: 9.8 MG/DL
CHLORIDE SERPL-SCNC: 106 MMOL/L
CHOLEST SERPL-MCNC: 139 MG/DL
CK SERPL-CCNC: 54 U/L
CO2 SERPL-SCNC: 24 MMOL/L
CREAT SERPL-MCNC: 0.77 MG/DL
EOSINOPHIL # BLD AUTO: 0.31 K/UL
EOSINOPHIL NFR BLD AUTO: 4.7 %
ESTIMATED AVERAGE GLUCOSE: 100 MG/DL
GLUCOSE SERPL-MCNC: 80 MG/DL
HBA1C MFR BLD HPLC: 5.1 %
HCT VFR BLD CALC: 38.2 %
HDLC SERPL-MCNC: 44 MG/DL
HGB BLD-MCNC: 12.2 G/DL
IMM GRANULOCYTES NFR BLD AUTO: 0.2 %
LDLC SERPL CALC-MCNC: 80 MG/DL
LYMPHOCYTES # BLD AUTO: 2.31 K/UL
LYMPHOCYTES NFR BLD AUTO: 35.2 %
MAN DIFF?: NORMAL
MCHC RBC-ENTMCNC: 29.8 PG
MCHC RBC-ENTMCNC: 31.9 GM/DL
MCV RBC AUTO: 93.2 FL
MONOCYTES # BLD AUTO: 0.5 K/UL
MONOCYTES NFR BLD AUTO: 7.6 %
NEUTROPHILS # BLD AUTO: 3.28 K/UL
NEUTROPHILS NFR BLD AUTO: 50 %
NONHDLC SERPL-MCNC: 95 MG/DL
PLATELET # BLD AUTO: 368 K/UL
POTASSIUM SERPL-SCNC: 5 MMOL/L
PROT SERPL-MCNC: 6.6 G/DL
RBC # BLD: 4.1 M/UL
RBC # FLD: 13.9 %
SODIUM SERPL-SCNC: 142 MMOL/L
TRIGL SERPL-MCNC: 73 MG/DL
WBC # FLD AUTO: 6.56 K/UL

## 2022-02-07 ENCOUNTER — NON-APPOINTMENT (OUTPATIENT)
Age: 52
End: 2022-02-07

## 2022-02-07 ENCOUNTER — APPOINTMENT (OUTPATIENT)
Dept: CARDIOLOGY | Facility: CLINIC | Age: 52
End: 2022-02-07
Payer: COMMERCIAL

## 2022-02-07 VITALS
SYSTOLIC BLOOD PRESSURE: 152 MMHG | DIASTOLIC BLOOD PRESSURE: 84 MMHG | OXYGEN SATURATION: 97 % | WEIGHT: 248 LBS | TEMPERATURE: 98.6 F | HEART RATE: 86 BPM | HEIGHT: 67 IN | BODY MASS INDEX: 38.92 KG/M2

## 2022-02-07 DIAGNOSIS — Z01.810 ENCOUNTER FOR PREPROCEDURAL CARDIOVASCULAR EXAMINATION: ICD-10-CM

## 2022-02-07 DIAGNOSIS — Z86.79 PERSONAL HISTORY OF OTHER DISEASES OF THE CIRCULATORY SYSTEM: ICD-10-CM

## 2022-02-07 DIAGNOSIS — Z86.39 PERSONAL HISTORY OF OTHER ENDOCRINE, NUTRITIONAL AND METABOLIC DISEASE: ICD-10-CM

## 2022-02-07 PROCEDURE — 93000 ELECTROCARDIOGRAM COMPLETE: CPT

## 2022-02-07 PROCEDURE — 99214 OFFICE O/P EST MOD 30 MIN: CPT

## 2022-02-08 PROBLEM — Z01.810 PREOPERATIVE CARDIOVASCULAR EXAMINATION: Status: ACTIVE | Noted: 2022-02-08

## 2022-02-08 PROBLEM — Z86.79 HISTORY OF CORONARY ATHEROSCLEROSIS: Status: RESOLVED | Noted: 2022-02-08 | Resolved: 2022-02-08

## 2022-02-08 PROBLEM — Z86.39 HISTORY OF HYPERLIPIDEMIA: Status: RESOLVED | Noted: 2021-09-10 | Resolved: 2022-02-08

## 2022-02-08 NOTE — DISCUSSION/SUMMARY
[FreeTextEntry1] : Atherosclerotic heart disease\par Atherosclerotic heart disease is stable. A 12/21 stress sestamibi study demonstrated a small area of mild intra-apical and infra-apical ischemia.  The resting 2/22 electrocardiogram is normal without any evidence of myocardial ischemia or infarction. Left ventricular systolic function is normal as reflected by a left ventricular ejection fraction of 75% on the 10/21 echocardiogram and 78% on the 12/21 gaited  sestamibi study..In view of the lack of symptoms, above noninvasive studies and clinical course continued medical management is indicated at this time. Measures to control modifiable risk factors for the development of atherosclerotic disease will be important in long-term management.\par \par I have recommended the following\par a. Aspirin 81 mg/day if no hematological contraindication (Dr. JULIO Esquivel to review)\par b. no further cardiac testing at this time\par c. Risk factor modification as noted above\par \par \par \par Atrial fibrillation\par The working diagnosis is paroxysmal atrial fibrillation secondary to hypertensive -atherosclerotic  heart disease exacerbated by obesity. The history is consistent with this diagnosis. The present rate control strategy appears to be effective. An elevated "[id number]" score is indicative of an increased risk of systemic/cerebral emboli. Factor Xa. inhibitor therapy is indicated. Atrial fibrillation is expected to recur while taking the present medical regimen. The goal of treatment is control of the ventricular response and prevention of cardio embolic events.\par \par I have recommended the following\par a. Continue the present medical regimen\par b. No further cardiac testing for this problem at this time\par c. Consideration for a Zio patch/mobile telemetry study   later 2022  or 2023\par \par \par \par \par Hypertension\par Hypertension has reportedly been controlled while taking the present medical regimen. Elevation of blood pressure on initial examination today (152/84 mmHg) is attributed to a white coat effect. In the setting of atrial fibrillation diabetes beta blocker and Ace-I./ARB therapy are attractive. The dose of nadolol  may be increased if required to maintain optimal levels.\par \par I have recommended the following\par a. Continue the present medical regimen\par b. Low salt low fat low cholesterol/triglycerides diabetic diet. Regular aerobic exercise and weight loss\par c. Home blood pressure monitoring\par d. Increase  nadolol dose if required to maintain optimal levels as discussed above\par \par \par \par Hyperlipidemia\par Hyperlipidemia represents a risk factor for  progressive atherosclerotic heart disease.In the setting of a diagnosis of atherosclerotic heart disease the target LDL level is  about 70. The present medical regimen has been effective.. In 2/22 the serum cholesterol level was 139 HDL 44 triglycerides 73 and LDL 80.. HMG coA reductase inhibitor therapy may be added to the present medical regimen if required to maintain optimal levels  Nonpharmacological therapy specifically diet exercise and weight loss are  emphasized  as  major aspects of treatment.\par \par I have recommended the following\par a. Low salt low fat low cholesterol/triglyceride diet. Regular aerobic exercise and weight loss\par b. Continue the present medical regimen\par c. Target LDL level to about  70 as discussed above\par d  HMG coA  reductase inhibitor therapy if required to maintain optimal levels as discussed above\par \par \par \par Valvular heart disease\par Ms. Delgado carries a diagnosis of mitral valve prolapse in the past. However the 10/21 transthoracic echocardiogram  mild thickened leaflets with trace regurgitation and no prolapse.Aortic sclerosis was noted. The pulmonary artery systolic pressure was 28 mmHg.. The left ventricular  ejection fraction  was  75 %. The present cardiac physical examination is not suggestive of hemodynamic significant valvular pathology.\par \par \par I have recommended the following\par a. No further cardiac testing for this problem at this time\par \par \par \par Ventricular tachycardia\par Nonsustained ventricular tachycardia was reportedly seen on a 2014 Holter monitor. That study is not available for review. In the setting of normal left ventricular systolic function (left ventricle ejection fraction  75% 10/21 echocardiogram) the risk of the development of a malignant ventricular arrhythmia is low.\par \par I have recommended the following\par a.  no further cardiac testing for this problem at this time\par b. consideration for Zio patch/mobile telemetry study  later   2022 or 2023\par \par \par \par Preoperative cardiovascular examination\par \par Patricia was diagnosed as having a trigger finger involving the right hand. Surgical intervention has been advised by Dr. Benavides .\par \par Comment\par There is no cardiac contraindication to hand surgery. There is no history of a myocardial infarction. The patient is hemodynamically stable without evidence of heart failure. The preoperative resting 2/22 electrocardiogram is normal without any evidence of myocardial ischemia or infarction. As such the operation may proceed with an acceptable cardiac risk. In an effort to minimize the potential for bleeding complications xarelto may safely be held prior to surgery.\par \par I have recommended the following\par a. Discontinue xarelto 2 days prior to surgery\par b. Reinstitution of xarelto postoperatively as soon as possible as directed by the operating surgeon\par c. Further cardiac testing is not required\par d workup and treatment as directed by Dr. Benavides\par e call if any perioperative cardiovascular issues arise.. \par \par \par \par \par \par Obesity\par Obesity exacerbates carries cardiovascular issues. Today Ms. Delgado is 5 feet 7 inches tall and weighs 248  pounds. Diet exercise and weight loss are advised.\par \par \par The diagnosis, prognosis, risks, options and alternatives were explained at length to the patient. All questions were answered. Issues discussed included atrial fibrillation anticoagulation hypertension hyperlipidemia noninvasive cardiac testing antihypertensive agents antihyperlipidemic agents obesity diet exercise and weight loss.\par \par \par Counseling and/or coordination of care\par Time was a significant factor for this patient encounter. Total time spent with the patient was 30 minutes. 50% of the time was devoted to counseling and/or coordination of care\par \par

## 2022-02-08 NOTE — HISTORY OF PRESENT ILLNESS
[FreeTextEntry1] : 51-year-old female\par Routine followup\par "I feel okay." Patricia denies chest pain, palpitations, shortness of breath or syncope. She is physically active exercising regularly without restriction or limitation.\par \par Patricia was diagnosed as having a trigger finger involving the right hand. Surgical intervention has been advised by Dr. Benavides .

## 2022-02-09 ENCOUNTER — APPOINTMENT (OUTPATIENT)
Dept: BARIATRICS/WEIGHT MGMT | Facility: CLINIC | Age: 52
End: 2022-02-09
Payer: COMMERCIAL

## 2022-02-09 VITALS — BODY MASS INDEX: 37.93 KG/M2 | WEIGHT: 242.2 LBS

## 2022-02-09 PROCEDURE — 99213 OFFICE O/P EST LOW 20 MIN: CPT | Mod: 95

## 2022-02-14 ENCOUNTER — APPOINTMENT (OUTPATIENT)
Dept: PODIATRY | Facility: CLINIC | Age: 52
End: 2022-02-14
Payer: COMMERCIAL

## 2022-02-14 VITALS — HEIGHT: 67 IN | BODY MASS INDEX: 38.45 KG/M2 | WEIGHT: 245 LBS

## 2022-02-14 DIAGNOSIS — M25.80 OTHER SPECIFIED JOINT DISORDERS, UNSPECIFIED JOINT: ICD-10-CM

## 2022-02-14 DIAGNOSIS — M76.60 ACHILLES TENDINITIS, UNSPECIFIED LEG: ICD-10-CM

## 2022-02-14 DIAGNOSIS — M92.71 JUVENILE OSTEOCHONDROSIS OF METATARSUS, RIGHT FOOT: ICD-10-CM

## 2022-02-14 DIAGNOSIS — M77.51 OTHER ENTHESOPATHY OF RT FOOT AND ANKLE: ICD-10-CM

## 2022-02-14 PROCEDURE — 99213 OFFICE O/P EST LOW 20 MIN: CPT

## 2022-02-14 NOTE — PHYSICAL EXAM
[Skin Turgor] : normal skin turgor [Skin Color & Pigmentation] : normal skin color and pigmentation [] : no rash [Skin Lesions] : no skin lesions [Sensation] : the sensory exam was normal to light touch and pinprick [No Focal Deficits] : no focal deficits [Deep Tendon Reflexes (DTR)] : deep tendon reflexes were 2+ and symmetric [Motor Exam] : the motor exam was normal [FreeTextEntry3] : Vascular exam reveals palpable pedal pulses, the foot is warm to touch, there was good capillary fill time, the skin is normal in appearance there is no evidence of vascular disease or compromise at this time [de-identified] : plantar heel pain reolved, plantar foot pain resolved\par pain to insertion of achiiles right\par no void in the tendon [Foot Ulcer] : no foot ulcer [Skin Induration] : no skin induration

## 2022-02-14 NOTE — PROCEDURE
[FreeTextEntry1] : i have dispensed a pair of heel cushions to the patient and advised the patient that accommodative support as well as elevation of both heels to help reduce symptoms\par The patient was advised formal physical therapy is critical at this point and that I recommend it in order to try and achieve best possible outcome to their problem. She still has RX\par Possibly the met pad is too high and the 2nd toe is hitting tip of sneaker\par A complete and thorough evaluation of the type of shoes they should be wearing and type of shoes for this time of year was discussed with patient.\par Think heel lift is good but met pads may need to be lowered.\par follow up appt 4 weeks or sooner if needed.\par \par \par \par \par \par \par

## 2022-02-14 NOTE — HISTORY OF PRESENT ILLNESS
[FreeTextEntry1] : Right foot\par Location: LMB # 2-doing much better\par Left Foot:\par Location: plantar aspect of the 1st met -sesamoid area-also doing much better\par Hx of left heel pain doing well\par \par Orthotics are comfortable\par \par New complaints\par right foot-distal achilles-started about 5 days ago-after a longer walk\par \par Stopped PT when she was doing ok\par \par \par

## 2022-02-17 ENCOUNTER — TRANSCRIPTION ENCOUNTER (OUTPATIENT)
Age: 52
End: 2022-02-17

## 2022-02-23 ENCOUNTER — APPOINTMENT (OUTPATIENT)
Dept: PULMONOLOGY | Facility: CLINIC | Age: 52
End: 2022-02-23
Payer: COMMERCIAL

## 2022-02-23 VITALS
DIASTOLIC BLOOD PRESSURE: 80 MMHG | HEART RATE: 78 BPM | TEMPERATURE: 97.4 F | HEIGHT: 67 IN | OXYGEN SATURATION: 98 % | WEIGHT: 239 LBS | BODY MASS INDEX: 37.51 KG/M2 | SYSTOLIC BLOOD PRESSURE: 130 MMHG

## 2022-02-23 PROCEDURE — 99213 OFFICE O/P EST LOW 20 MIN: CPT

## 2022-02-23 NOTE — REVIEW OF SYSTEMS
[Recent Wt Loss (___ Lbs)] : ~T recent [unfilled] lb weight loss [Obesity] : obesity [Negative] : Psychiatric

## 2022-02-23 NOTE — PHYSICAL EXAM
[No Acute Distress] : no acute distress [Normal Appearance] : normal appearance [No Neck Mass] : no neck mass [Normal Rate/Rhythm] : normal rate/rhythm [Normal S1, S2] : normal s1, s2 [No Resp Distress] : no resp distress [Clear to Auscultation Bilaterally] : clear to auscultation bilaterally [No Abnormalities] : no abnormalities [Normal Gait] : normal gait [No Clubbing] : no clubbing [No Cyanosis] : no cyanosis [No Focal Deficits] : no focal deficits [Oriented x3] : oriented x3 [Normal Affect] : normal affect

## 2022-03-09 ENCOUNTER — APPOINTMENT (OUTPATIENT)
Dept: BARIATRICS/WEIGHT MGMT | Facility: CLINIC | Age: 52
End: 2022-03-09
Payer: COMMERCIAL

## 2022-03-09 VITALS — BODY MASS INDEX: 37.53 KG/M2 | WEIGHT: 239.6 LBS

## 2022-03-09 PROCEDURE — 99442: CPT | Mod: 95

## 2022-03-09 NOTE — ASSESSMENT
[FreeTextEntry1] : 51 year old female with class II obesity \par Continue metformin ER 2g daily, Contrave 3-4 tablets daily, Ozempic 1mg daily\par Continue daily exercise\par Continue mindful eating, balanced meals, adequate hydration\par Patient changing insurance- will email office to let me know if she needs new Rx for Wegovy and PA for contrave (if doing well on current dose will continue Ozempic so that the insurance will continue to pay for 1 obesity medication- Contrave instead of out of pocket Contrave and Wegovy).  \par F/U 1 month \par

## 2022-03-09 NOTE — HISTORY OF PRESENT ILLNESS
[FreeTextEntry1] : 50 year old female referred for medical weight loss consultation.\par Patient has struggled with her weight since childhood.  Has tried many diets including WW, atkins.  Both parents struggle with their weight.  Her sister is an RD, MALVIN \par Was seeing Dr. Bryan at [facility] for medical weight loss- currently taking metformin ER 500mg BID, ozempic 0.5mg/week (no known history of diabetes or prediabetes HbA1C 5.0 4/2021)\yvon Has treadmill and bike in her hotel room.  Going to live there for 1 year until construction on her new [employer] is done\par Lost her job at [employer] during the pandemic- going to be switching insurance soon\par Breakfast daily at [facility]- switched from bagels/waffles to yogurt and eggs\par Wears CPAP for AQUILES \par Highest weight 5 years ago 277, current weight 245.8\par Good water intake\par Struggling to meet people in [place]- doing [employer] events \par Immunocompromised- UTD with COVID vaccination \par Not interested in bariatric surgery \par \par 12/15/21- Patient -4 pounds.  Had issue with nausea/vomiting (does not believe it was tied to the higher dose of Ozempic but d/brad the medication anyway).  Now reading to restart.  Had exercise stress test with abnormal results- nuclear stress test pending later this week.  Good hydration and sleep- wearing CPAP more.  \par \par 2/9/22- Patient doing well.  On Ozempic 0.5mg/week without negative side effect and is planning to increase next week back up to 1mg/week.  Exercising greater than 150 minutes/week- treadmill HIIT workouts, good water intake, meditation daily, wearing her CPAP, pleased with her results on recent labs with her cardiologist.  Working to increase her fruits and vegetables which has been a challenge since she is a picky eater.  Focusing on meeting most of her health goals daily.  Trying to increase plant based foods.  \par \par 3/9/22- Patient doing well.  Exercising daily.  Eating more fruits and vegetables.  Established healthy habits.  Feeling well on Ozempic 1mg/weekly- no negative side effects good appetite control

## 2022-03-17 ENCOUNTER — APPOINTMENT (OUTPATIENT)
Dept: INTERNAL MEDICINE | Facility: CLINIC | Age: 52
End: 2022-03-17
Payer: COMMERCIAL

## 2022-03-17 VITALS — TEMPERATURE: 98 F | BODY MASS INDEX: 37.67 KG/M2 | HEIGHT: 67 IN | WEIGHT: 240 LBS

## 2022-03-17 VITALS — DIASTOLIC BLOOD PRESSURE: 80 MMHG | SYSTOLIC BLOOD PRESSURE: 140 MMHG

## 2022-03-17 DIAGNOSIS — Z13.820 ENCOUNTER FOR SCREENING FOR OSTEOPOROSIS: ICD-10-CM

## 2022-03-17 PROCEDURE — 99396 PREV VISIT EST AGE 40-64: CPT | Mod: 25

## 2022-03-17 PROCEDURE — 36415 COLL VENOUS BLD VENIPUNCTURE: CPT

## 2022-03-17 RX ORDER — ASPIRIN ENTERIC COATED TABLETS 81 MG 81 MG/1
81 TABLET, DELAYED RELEASE ORAL
Refills: 0 | Status: ACTIVE | COMMUNITY
Start: 2022-03-17

## 2022-03-17 RX ORDER — NADOLOL 80 MG/1
80 TABLET ORAL DAILY
Qty: 90 | Refills: 3 | Status: DISCONTINUED | COMMUNITY
Start: 2021-11-16 | End: 2022-03-17

## 2022-03-17 RX ORDER — IMMUNE GLOBULIN SUBCUTANEOUS, HUMAN-KLHW 200 MG/ML
10 SOLUTION SUBCUTANEOUS
Qty: 200 | Refills: 0 | Status: DISCONTINUED | COMMUNITY
Start: 2021-12-01 | End: 2022-03-17

## 2022-03-17 RX ORDER — SEMAGLUTIDE 1.34 MG/ML
2 INJECTION, SOLUTION SUBCUTANEOUS
Qty: 4 | Refills: 0 | Status: DISCONTINUED | COMMUNITY
Start: 2021-06-21 | End: 2022-03-17

## 2022-03-17 RX ORDER — OMEGA-3/DHA/EPA/FISH OIL 300-1000MG
1000 CAPSULE,DELAYED RELEASE (ENTERIC COATED) ORAL TWICE DAILY
Refills: 0 | Status: ACTIVE | COMMUNITY
Start: 2022-03-17

## 2022-03-17 RX ORDER — BECLOMETHASONE DIPROPIONATE HFA 40 UG/1
40 AEROSOL, METERED RESPIRATORY (INHALATION)
Refills: 0 | Status: ACTIVE | COMMUNITY
Start: 2022-03-17

## 2022-03-17 RX ORDER — ZINC OXIDE 13 %
CREAM (GRAM) TOPICAL
Refills: 0 | Status: ACTIVE | COMMUNITY
Start: 2022-03-17

## 2022-03-17 RX ORDER — MULTIVITAMIN
TABLET ORAL
Refills: 0 | Status: ACTIVE | COMMUNITY
Start: 2022-03-17

## 2022-03-17 RX ORDER — TURMERIC/TURMERIC EXT/PEPR EXT 500 MG-3MG
3-500 CAPSULE ORAL TWICE DAILY
Refills: 0 | Status: ACTIVE | COMMUNITY
Start: 2022-03-17

## 2022-03-17 NOTE — HEALTH RISK ASSESSMENT
[Yes] : Yes [Monthly or less (1 pt)] : Monthly or less (1 point) [Never (0 pts)] : Never (0 points) [1 or 2 (0 pts)] : 1 or 2 (0 points) [No] : In the past 12 months have you used drugs other than those required for medical reasons? No [No falls in past year] : Patient reported no falls in the past year [0] : 2) Feeling down, depressed, or hopeless: Not at all (0) [Patient reported PAP Smear was normal] : Patient reported PAP Smear was normal [HIV test declined] : HIV test declined [Alone] : lives alone [# of Members in Household ___] :  household currently consist of [unfilled] member(s) [Graduate School] : graduate school [Retired] : retired [Single] : single [# Of Children ___] : has [unfilled] children [Feels Safe at Home] : Feels safe at home [Reports normal functional visual acuity (ie: able to read med bottle)] : Reports normal functional visual acuity [Smoke Detector] : smoke detector [Carbon Monoxide Detector] : carbon monoxide detector [Seat Belt] :  uses seat belt [Sunscreen] : uses sunscreen [Never] : Never [PHQ-2 Negative - No further assessment needed] : PHQ-2 Negative - No further assessment needed [Patient reported mammogram was normal] : Patient reported mammogram was normal [Hepatitis C test declined] : Hepatitis C test declined [Patient/Caregiver not ready to engage] : , patient/caregiver not ready to engage [Patient reported colonoscopy was normal] : Patient reported colonoscopy was normal [Audit-CScore] : 1 [de-identified] : rare [de-identified] : treadmill , 4-5 x week ,walks  [de-identified] : well balanced  [[id number]] : 0 [Reports changes in hearing] : Reports no changes in hearing [Reports changes in vision] : Reports no changes in vision [Reports changes in dental health] : Reports no changes in dental health [Guns at Home] : no guns at home [Safety elements used in home] : no safety elements used in home [Travel to Developing Areas] : does not  travel to developing areas [Caregiver Concerns] : does not have caregiver concerns [MammogramDate] : 12/7/2021 [MammogramComments] : & [place] [facility] [PapSmearDate] : 11/18/2021 [PapSmearComments] : Dr. 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

## 2022-03-17 NOTE — ASSESSMENT
[FreeTextEntry1] : Up to date with screening exams\par Will get DEXA in setting of hypothyroidism\par May be due for 2nd pneumovax, advised to discuss with immunologist

## 2022-03-17 NOTE — HISTORY OF PRESENT ILLNESS
[FreeTextEntry1] : physical [de-identified] : Patient is a 51F with a history of Ham Syndrome (ITP and AIHA), Hypogammaglobulinemia, Hypertension, paroxysmal atrial fibrillation, Asthma, hypertriglyceridemia, Hashimoto's thyroiditis, mild AQUILES, nephrolithiasis presents today to establish care and for annual physical\par Currently in temporary housing until new construction is ready in June\par \par Hematology:  Dr. Indy Esquivel @[facility]\par Immunology:  Dr. Aliya Vazquez ([facility])\par Gynecology: Dr. Lacie Santamaria [facility] ([place]) PAP 6/2020, Mammo 2019 NEXT in July 2021\par Weight loss specialist: Areli Garsia NP\par Ophthalmology: Dr. Constance Hylton [facility], would like ophthalmology referral\par Urology:Dr. Daily\par Dermatology: Dr. Kyle James [facility], would like dermatology referral\par Pulmonology: Dr. Sanchez \par Cardiology: Dr. Arshad\par Ortho: Dr. Shen Zuñiga\par GI: Dr. Yady Kirkpatrick\par \par Dr. Benavides - finger surgery (trigger) next week

## 2022-03-18 RX ORDER — AMLODIPINE BESYLATE 10 MG/1
10 TABLET ORAL DAILY
Qty: 90 | Refills: 3 | Status: COMPLETED | COMMUNITY
End: 2022-03-17

## 2022-03-21 ENCOUNTER — TRANSCRIPTION ENCOUNTER (OUTPATIENT)
Age: 52
End: 2022-03-21

## 2022-03-21 ENCOUNTER — RESULT REVIEW (OUTPATIENT)
Age: 52
End: 2022-03-21

## 2022-03-23 ENCOUNTER — HOSPITAL ENCOUNTER (OUTPATIENT)
Dept: HOSPITAL 74 - FASU | Age: 52
Discharge: HOME | End: 2022-03-23
Attending: ORTHOPAEDIC SURGERY
Payer: COMMERCIAL

## 2022-03-23 VITALS — SYSTOLIC BLOOD PRESSURE: 145 MMHG | HEART RATE: 77 BPM | DIASTOLIC BLOOD PRESSURE: 83 MMHG

## 2022-03-23 VITALS — BODY MASS INDEX: 37.5 KG/M2

## 2022-03-23 VITALS — TEMPERATURE: 97.5 F

## 2022-03-23 DIAGNOSIS — M65.341: ICD-10-CM

## 2022-03-23 DIAGNOSIS — M65.331: Primary | ICD-10-CM

## 2022-03-23 DIAGNOSIS — M65.351: ICD-10-CM

## 2022-03-23 PROCEDURE — 0LN70ZZ RELEASE RIGHT HAND TENDON, OPEN APPROACH: ICD-10-PCS | Performed by: ORTHOPAEDIC SURGERY

## 2022-03-30 ENCOUNTER — TRANSCRIPTION ENCOUNTER (OUTPATIENT)
Age: 52
End: 2022-03-30

## 2022-03-30 LAB
25(OH)D3 SERPL-MCNC: 31.9 NG/ML
CHOLEST SERPL-MCNC: 143 MG/DL
ESTIMATED AVERAGE GLUCOSE: 97 MG/DL
HBA1C MFR BLD HPLC: 5 %
HDLC SERPL-MCNC: 48 MG/DL
LDLC SERPL CALC-MCNC: 73 MG/DL
NONHDLC SERPL-MCNC: 96 MG/DL
TRIGL SERPL-MCNC: 114 MG/DL
TSH SERPL-ACNC: 2.66 UIU/ML

## 2022-04-04 ENCOUNTER — TRANSCRIPTION ENCOUNTER (OUTPATIENT)
Age: 52
End: 2022-04-04

## 2022-04-04 ENCOUNTER — NON-APPOINTMENT (OUTPATIENT)
Age: 52
End: 2022-04-04

## 2022-04-05 ENCOUNTER — TRANSCRIPTION ENCOUNTER (OUTPATIENT)
Age: 52
End: 2022-04-05

## 2022-04-06 ENCOUNTER — NON-APPOINTMENT (OUTPATIENT)
Age: 52
End: 2022-04-06

## 2022-04-06 ENCOUNTER — TRANSCRIPTION ENCOUNTER (OUTPATIENT)
Age: 52
End: 2022-04-06

## 2022-04-06 RX ORDER — FENOFIBRATE 150 MG/1
150 CAPSULE ORAL
Qty: 90 | Refills: 3 | Status: DISCONTINUED | COMMUNITY
Start: 2021-03-18 | End: 2022-04-06

## 2022-04-11 ENCOUNTER — RX RENEWAL (OUTPATIENT)
Age: 52
End: 2022-04-11

## 2022-04-11 PROBLEM — Z11.59 SCREENING FOR VIRAL DISEASE: Status: RESOLVED | Noted: 2021-04-19 | Resolved: 2021-10-04

## 2022-04-13 ENCOUNTER — APPOINTMENT (OUTPATIENT)
Dept: BARIATRICS/WEIGHT MGMT | Facility: CLINIC | Age: 52
End: 2022-04-13
Payer: COMMERCIAL

## 2022-04-13 VITALS — BODY MASS INDEX: 37.12 KG/M2 | WEIGHT: 237 LBS

## 2022-04-13 PROCEDURE — 99213 OFFICE O/P EST LOW 20 MIN: CPT | Mod: 95

## 2022-04-13 NOTE — HISTORY OF PRESENT ILLNESS
[FreeTextEntry1] : 50 year old female referred for medical weight loss consultation.\par Patient has struggled with her weight since childhood.  Has tried many diets including WW, atkins.  Both parents struggle with their weight.  Her sister is an RD, MALVIN \par Was seeing Dr. Bryan at [place] for medical weight loss- currently taking metformin ER 500mg BID, ozempic 0.5mg/week (no known history of diabetes or prediabetes HbA1C 5.0 4/2021)\yvon Has treadmill and bike in her hotel room.  Going to live there for 1 year until construction on her new [place] is done\par Lost her job at [employer] during the pandemic- going to be switching insurance soon\par Breakfast daily at hot- switched from bagels/waffles to yogurt and eggs\par Wears CPAP for AQUILES \par Highest weight 5 years ago 277, current weight 245.8\par Good water intake\par Struggling to meet people in [place]- doing [employer] events \par Immunocompromised- UTD with COVID vaccination \par Not interested in bariatric surgery \par \par 12/15/21- Patient -4 pounds.  Had issue with nausea/vomiting (does not believe it was tied to the higher dose of Ozempic but d/brad the medication anyway).  Now reading to restart.  Had exercise stress test with abnormal results- nuclear stress test pending later this week.  Good hydration and sleep- wearing CPAP more.  \par \par 2/9/22- Patient doing well.  On Ozempic 0.5mg/week without negative side effect and is planning to increase next week back up to 1mg/week.  Exercising greater than 150 minutes/week- treadmill HIIT workouts, good water intake, meditation daily, wearing her CPAP, pleased with her results on recent labs with her cardiologist.  Working to increase her fruits and vegetables which has been a challenge since she is a picky eater.  Focusing on meeting most of her health goals daily.  Trying to increase plant based foods.  \par \par 3/9/22- Patient doing well.  Exercising daily.  Eating more fruits and vegetables.  Established healthy habits.  Feeling well on Ozempic 1mg/weekly- no negative side effects good appetite control  \par \par 4/13/22- -3 pounds.  Had hand surgery- now recovering- hand still wrapped.  Mindful eating, had to stop exercising after surgery now can restart.  Good fruit and vegetable intake.  Walking for exercise, recumbent bike.

## 2022-04-13 NOTE — ASSESSMENT
[FreeTextEntry1] : 51 year old female with class II obesity\par Continue Metformin 2g daily, Ozempic 0.5mg weekly and Contrave 3 tablets/day\par Continue walking/biking for exercise\par Mindful eating, healthy balance in her diet\par Continue adequate hydration\par CPAP while sleeping \par F/U 1 month

## 2022-04-13 NOTE — REASON FOR VISIT
[Home] : at home, [unfilled] , at the time of the visit. [Medical Office: ([facility])___] : at the medical office located in  [Verbal consent obtained from patient] : the patient, [unfilled] [Follow-Up Visit] : a follow-up visit for [Obesity] : obesity

## 2022-04-18 ENCOUNTER — TRANSCRIPTION ENCOUNTER (OUTPATIENT)
Age: 52
End: 2022-04-18

## 2022-04-19 ENCOUNTER — TRANSCRIPTION ENCOUNTER (OUTPATIENT)
Age: 52
End: 2022-04-19

## 2022-04-25 ENCOUNTER — TRANSCRIPTION ENCOUNTER (OUTPATIENT)
Age: 52
End: 2022-04-25

## 2022-06-10 ENCOUNTER — APPOINTMENT (OUTPATIENT)
Dept: BARIATRICS/WEIGHT MGMT | Facility: CLINIC | Age: 52
End: 2022-06-10
Payer: COMMERCIAL

## 2022-06-10 VITALS — WEIGHT: 235.8 LBS | BODY MASS INDEX: 36.93 KG/M2

## 2022-06-10 PROCEDURE — 99214 OFFICE O/P EST MOD 30 MIN: CPT | Mod: 95

## 2022-06-10 NOTE — HISTORY OF PRESENT ILLNESS
[FreeTextEntry1] : 50 year old female referred for medical weight loss consultation.\par Patient has struggled with her weight since childhood.  Has tried many diets including WW, atkins.  Both parents struggle with their weight.  Her sister is an RD, MALVIN \par Was seeing Dr. Bryan at [place] for medical weight loss- currently taking metformin ER 500mg BID, ozempic 0.5mg/week (no known history of diabetes or prediabetes HbA1C 5.0 4/2021)\yvon Has treadmill and bike in her hotel room.  Going to live there for 1 year until construction on her new [place] is done\par Lost her job at [employer] during the pandemic- going to be switching insurance soon\par Breakfast daily at hot- switched from bagels/waffles to yogurt and eggs\par Wears CPAP for AQUILES \par Highest weight 5 years ago 277, current weight 245.8\par Good water intake\par Struggling to meet people in [place]- doing [employer] events \par Immunocompromised- UTD with COVID vaccination \par Not interested in bariatric surgery \par \par 12/15/21- Patient -4 pounds.  Had issue with nausea/vomiting (does not believe it was tied to the higher dose of Ozempic but d/brad the medication anyway).  Now reading to restart.  Had exercise stress test with abnormal results- nuclear stress test pending later this week.  Good hydration and sleep- wearing CPAP more.  \par \par 2/9/22- Patient doing well.  On Ozempic 0.5mg/week without negative side effect and is planning to increase next week back up to 1mg/week.  Exercising greater than 150 minutes/week- treadmill HIIT workouts, good water intake, meditation daily, wearing her CPAP, pleased with her results on recent labs with her cardiologist.  Working to increase her fruits and vegetables which has been a challenge since she is a picky eater.  Focusing on meeting most of her health goals daily.  Trying to increase plant based foods.  \par \par 3/9/22- Patient doing well.  Exercising daily.  Eating more fruits and vegetables.  Established healthy habits.  Feeling well on Ozempic 1mg/weekly- no negative side effects good appetite control  \par \par 4/13/22- -3 pounds.  Had hand surgery- now recovering- hand still wrapped.  Mindful eating, had to stop exercising after surgery now can restart.  Good fruit and vegetable intake.  Walking for exercise, recumbent bike.\par \par 6/10/22- -1.5 pounds. Feels she is losing more inches than pounds.  Highest weight in 2017 286 pounds.  Started weight lifting since osteopenia diagnosis.  +Stress over her townhouse, still living in hotel, and her cat's health.

## 2022-06-10 NOTE — ASSESSMENT
[FreeTextEntry1] : 51 year old female with class II obesity\par Increase Contrave to 4 tablets/day- even with insurance coverage she has a high deductible.  Discussed mail order pharmacy option to cut down cost\par Continue metformin 2g/day and Ozempic 0.5mg/week (had nausea on higher dose)\par Discussed meal planning in advance, cutting out juice and cream in tea, restarting Lose It abby, using home BCA scale for more accurate tracking\par Declined RD and office BCA scale at this time \par F/U 2 weeks

## 2022-07-01 ENCOUNTER — TRANSCRIPTION ENCOUNTER (OUTPATIENT)
Age: 52
End: 2022-07-01

## 2022-07-01 ENCOUNTER — APPOINTMENT (OUTPATIENT)
Dept: INTERNAL MEDICINE | Facility: CLINIC | Age: 52
End: 2022-07-01

## 2022-07-08 ENCOUNTER — TRANSCRIPTION ENCOUNTER (OUTPATIENT)
Age: 52
End: 2022-07-08

## 2022-07-08 LAB
BASOPHILS # BLD AUTO: 0.16 K/UL
BASOPHILS NFR BLD AUTO: 1.9 %
EOSINOPHIL # BLD AUTO: 0.39 K/UL
EOSINOPHIL NFR BLD AUTO: 4.7 %
HCT VFR BLD CALC: 39.8 %
HGB BLD-MCNC: 12.2 G/DL
IMM GRANULOCYTES NFR BLD AUTO: 0.2 %
LYMPHOCYTES # BLD AUTO: 2.27 K/UL
LYMPHOCYTES NFR BLD AUTO: 27.2 %
MAN DIFF?: NORMAL
MCHC RBC-ENTMCNC: 30.2 PG
MCHC RBC-ENTMCNC: 30.7 GM/DL
MCV RBC AUTO: 98.5 FL
MONOCYTES # BLD AUTO: 0.76 K/UL
MONOCYTES NFR BLD AUTO: 9.1 %
NEUTROPHILS # BLD AUTO: 4.76 K/UL
NEUTROPHILS NFR BLD AUTO: 56.9 %
PLATELET # BLD AUTO: 345 K/UL
RBC # BLD: 4.04 M/UL
RBC # FLD: 14.3 %
WBC # FLD AUTO: 8.36 K/UL

## 2022-07-15 ENCOUNTER — TRANSCRIPTION ENCOUNTER (OUTPATIENT)
Age: 52
End: 2022-07-15

## 2022-07-19 ENCOUNTER — TRANSCRIPTION ENCOUNTER (OUTPATIENT)
Age: 52
End: 2022-07-19

## 2022-07-27 ENCOUNTER — RESULT REVIEW (OUTPATIENT)
Age: 52
End: 2022-07-27

## 2022-07-27 ENCOUNTER — APPOINTMENT (OUTPATIENT)
Dept: HEMATOLOGY ONCOLOGY | Facility: CLINIC | Age: 52
End: 2022-07-27

## 2022-07-27 VITALS
BODY MASS INDEX: 37.35 KG/M2 | TEMPERATURE: 97.8 F | SYSTOLIC BLOOD PRESSURE: 147 MMHG | HEART RATE: 66 BPM | WEIGHT: 238 LBS | RESPIRATION RATE: 16 BRPM | DIASTOLIC BLOOD PRESSURE: 88 MMHG | HEIGHT: 67 IN | OXYGEN SATURATION: 99 %

## 2022-07-27 PROCEDURE — 36415 COLL VENOUS BLD VENIPUNCTURE: CPT

## 2022-07-27 PROCEDURE — 99205 OFFICE O/P NEW HI 60 MIN: CPT | Mod: 25

## 2022-07-27 RX ORDER — AMOXICILLIN AND CLAVULANATE POTASSIUM 875; 125 MG/1; MG/1
875-125 TABLET, COATED ORAL
Qty: 14 | Refills: 0 | Status: COMPLETED | COMMUNITY
Start: 2022-04-14 | End: 2022-07-27

## 2022-07-27 RX ORDER — RIVAROXABAN 20 MG/1
20 TABLET, FILM COATED ORAL
Qty: 90 | Refills: 3 | Status: COMPLETED | COMMUNITY
Start: 2022-04-18 | End: 2022-07-27

## 2022-07-27 NOTE — ASSESSMENT
[FreeTextEntry1] : # Juan Alberto's syndrome \par  Dr. Indy Esquivel [facility]- wanted a local oncologist, moved to [place] last year.  \par currently not on medication\par \par #hypogamma - From [place] \par on xembify\par will arrange for evusheld\par \par #PE - during hospitalization for AIHA. was flying to [place] during that time\par xarelto\par \par #afib\par nadolol\par follows with Dr. Arshad\par \par #HTN - norvasc and diovan\par follows with Dr. Arshad\par \par LAB only in 6 months with cbc with diff, cmp, immunoglobulins. \par Will follow up in 1 year cbc with diff, cmp, immunoglobulins.

## 2022-07-27 NOTE — HISTORY OF PRESENT ILLNESS
[de-identified] : Ms. 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 Valdez.  She is also under the care of Dr. Indy Esquivel [facility]- wanted a local oncologist, moved to [place] last year.  \par She was diagnosed in 1997 at the age of 27.  She initially presented to her PCP with petechiae and a hematoma on her arm and a peripheral blood smear showed no platelets- she was worked up at [facility].\par \par Previous treatments included- IVIG, Prednisone in 1997 \par \par She then had another episode in [id number] and repeated Prednisone and IVIG- the transitioned to Vincristine, Win-rho, Danazol. \par \par In 2013 she was in [place] for a work trip she began to not feel well, present to a primary MD with fatigue, nausea and jaundice- her Hgb was 6.7, dropped to 4.7, she received 8 units of PRBC's, Prednisone, IVIG, and subsequently Rituximab weekly x 4 then high dose Dex.  She was found to have a Pulmonary Embolus while in the ICU> Lovenox then Coumadin.  She is now on Xarelto for AFIB.  \par \par Jan 2017- another ITP attack in [facility] platelets between 3-6k- she believes this may have been triggered to a varicella vaccine in 2016.  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 Vazquez\par Hematologist Dr. Esquivel\par Dr. Arshad cardiology

## 2022-07-28 ENCOUNTER — TRANSCRIPTION ENCOUNTER (OUTPATIENT)
Age: 52
End: 2022-07-28

## 2022-07-29 ENCOUNTER — APPOINTMENT (OUTPATIENT)
Dept: DISASTER EMERGENCY | Facility: CLINIC | Age: 52
End: 2022-07-29

## 2022-08-01 ENCOUNTER — APPOINTMENT (OUTPATIENT)
Dept: HEMATOLOGY ONCOLOGY | Facility: CLINIC | Age: 52
End: 2022-08-01

## 2022-08-01 ENCOUNTER — RESULT REVIEW (OUTPATIENT)
Age: 52
End: 2022-08-01

## 2022-08-01 VITALS
RESPIRATION RATE: 16 BRPM | TEMPERATURE: 97.7 F | HEIGHT: 67 IN | HEART RATE: 69 BPM | SYSTOLIC BLOOD PRESSURE: 155 MMHG | OXYGEN SATURATION: 99 % | BODY MASS INDEX: 37.35 KG/M2 | DIASTOLIC BLOOD PRESSURE: 81 MMHG | WEIGHT: 238 LBS

## 2022-08-03 RX ORDER — RIVAROXABAN 20 MG/1
20 TABLET, FILM COATED ORAL
Qty: 90 | Refills: 3 | Status: ACTIVE | COMMUNITY
Start: 2021-08-19

## 2022-08-08 ENCOUNTER — APPOINTMENT (OUTPATIENT)
Dept: CARDIOLOGY | Facility: CLINIC | Age: 52
End: 2022-08-08

## 2022-08-08 VITALS
HEART RATE: 70 BPM | OXYGEN SATURATION: 98 % | BODY MASS INDEX: 36.41 KG/M2 | WEIGHT: 232 LBS | HEIGHT: 67 IN | SYSTOLIC BLOOD PRESSURE: 161 MMHG | DIASTOLIC BLOOD PRESSURE: 82 MMHG

## 2022-08-08 PROCEDURE — 93000 ELECTROCARDIOGRAM COMPLETE: CPT

## 2022-08-08 PROCEDURE — 99214 OFFICE O/P EST MOD 30 MIN: CPT | Mod: 25

## 2022-08-09 ENCOUNTER — NON-APPOINTMENT (OUTPATIENT)
Age: 52
End: 2022-08-09

## 2022-08-09 RX ORDER — PSYLLIUM HUSK 0.4 G
CAPSULE ORAL
Refills: 0 | Status: ACTIVE | COMMUNITY

## 2022-08-09 NOTE — DISCUSSION/SUMMARY
[FreeTextEntry1] : Atherosclerotic heart disease\par Atherosclerotic heart disease is stable. A 12/21 stress sestamibi study demonstrated a small area of mild intra-apical and infra-apical ischemia.  The resting 8/22  electrocardiogram shows no  evidence of myocardial ischemia or infarction. Left ventricular systolic function is normal as reflected by a left ventricular ejection fraction of 75% on the 10/21 echocardiogram and 78% on the 12/21 gaited  sestamibi study..In view of the lack of symptoms, above noninvasive studies and clinical course continued medical management is indicated at this time. Measures to control modifiable risk factors for the development of atherosclerotic disease will be important in long-term management.\par \par I have recommended the following\par a. continue the present medical regimen \par b. no further cardiac testing at this time\par c. Risk factor modification as noted above\par \par \par \par Atrial fibrillation\par The working diagnosis is paroxysmal atrial fibrillation secondary to hypertensive -atherosclerotic  heart disease exacerbated by obesity. The history is consistent with this diagnosis. The present rate control strategy appears to be effective. An elevated "[id number]" score is indicative of an increased risk of systemic/cerebral emboli. Factor Xa. inhibitor therapy is indicated. Atrial fibrillation is expected to recur while taking the present medical regimen. The goal of treatment is control of the ventricular response and prevention of cardio embolic events.\par \par I have recommended the following\par a. Continue the present medical regimen\par b. No further cardiac testing for this problem at this time\par c. Consideration for a Zio patch/mobile telemetry study   later 2022  or 2023\par \par \par \par \par Hypertension\par Hypertension has reportedly been controlled while taking the present medical regimen.  Elevation of blood pressure on initial examination today (160/82 mmHg) is attributed to a white coat effect. In the setting of atrial fibrillation and  diabetes beta blocker and Ace-I./ARB therapy are attractive. The dose of nadolol  may be increased if required to maintain optimal levels.\par \par I have recommended the following\par a. Continue the present medical regimen\par b. Low salt low fat low cholesterol/triglycerides diabetic diet. Regular aerobic exercise and weight loss\par c. Home blood pressure monitoring\par d. Increase  nadolol dose if required to maintain optimal levels as discussed above\par \par \par \par Hyperlipidemia\par Hyperlipidemia represents a risk factor for  progressive atherosclerotic heart disease.In the setting of a diagnosis of atherosclerotic heart disease the target LDL level is  about 70. The present medical regimen has been effective.. In  3/22  the serum cholesterol level was 143  HDL 48  triglycerides 114  73 and LDL 73 .. HMG coA reductase inhibitor therapy may be added to the present medical regimen if required to maintain optimal levels  Nonpharmacological therapy specifically diet exercise and weight loss are  emphasized  as  major aspects of treatment.\par \par I have recommended the following\par a. Low salt low fat low cholesterol/triglyceride diet. Regular aerobic exercise and weight loss\par b. Continue the present medical regimen\par c. Target LDL level to about  70 as discussed above\par d  HMG coA  reductase inhibitor therapy if required to maintain optimal levels as discussed above\par \par \par \par Valvular heart disease\par Ms. Delgado carries a diagnosis of mitral valve prolapse in the past. However the 10/21 transthoracic echocardiogram  mild thickened leaflets with trace regurgitation and no prolapse.Aortic sclerosis was noted. The pulmonary artery systolic pressure was 28 mmHg.. The left ventricular  ejection fraction  was  75 %. The present cardiac physical examination is not suggestive of hemodynamic significant valvular pathology.\par \par \par I have recommended the following\par a. No further cardiac testing for this problem at this time\par \par \par \par Ventricular tachycardia\par Nonsustained ventricular tachycardia was reportedly seen on a 2014 Holter monitor. That study is not available for review. In the setting of normal left ventricular systolic function (left ventricle ejection fraction  75% 10/21 echocardiogram) the risk of the development of a malignant ventricular arrhythmia is low.\par \par I have recommended the following\par a.  no further cardiac testing for this problem at this time\par b. consideration for Zio patch/mobile telemetry study  later   2022 or 2023\par \par \par \par \par \par Obesity\par Obesity exacerbates carries cardiovascular issues. Today Ms. Delgado is 5 feet 7 inches tall and weighs 232  pounds.   She has  lost 16 pounds in the last 6 months    Continued  diet exercise and weight loss are advised.\par \par \par The diagnosis, prognosis, risks, options and alternatives were explained at length to the patient. All questions were answered. Issues discussed included  atherosclerotic heart disease  atrial fibrillation anticoagulation hypertension hyperlipidemia noninvasive cardiac testing antihypertensive agents antihyperlipidemic agents obesity diet exercise and weight loss.\par \par \par Counseling and/or coordination of care\par Time was a significant factor for this patient encounter. Total time spent with the patient was 30 minutes. 50% of the time was devoted to counseling and/or coordination of care\par \par

## 2022-08-09 NOTE — HISTORY OF PRESENT ILLNESS
[FreeTextEntry1] : 51-year-old female\par Routine followup\par "I feel okay"  Patricia denies chest pain shortness of breath or syncope. Rare palpitations are unchanged from the past and not progressive or severe. Home blood pressure levels have been normal

## 2022-08-09 NOTE — REVIEW OF SYSTEMS
[Feeling Fatigued] : feeling fatigued [Palpitations] : palpitations [Joint Pain] : joint pain [Negative] : Heme/Lymph [FreeTextEntry3] : glasses [FreeTextEntry5] : see history of present illness

## 2022-08-10 ENCOUNTER — APPOINTMENT (OUTPATIENT)
Dept: FAMILY MEDICINE | Facility: CLINIC | Age: 52
End: 2022-08-10

## 2022-08-18 ENCOUNTER — TRANSCRIPTION ENCOUNTER (OUTPATIENT)
Age: 52
End: 2022-08-18

## 2022-08-22 ENCOUNTER — NON-APPOINTMENT (OUTPATIENT)
Age: 52
End: 2022-08-22

## 2022-09-06 ENCOUNTER — APPOINTMENT (OUTPATIENT)
Dept: GASTROENTEROLOGY | Facility: CLINIC | Age: 52
End: 2022-09-06

## 2022-09-06 PROCEDURE — 36415 COLL VENOUS BLD VENIPUNCTURE: CPT

## 2022-09-07 LAB
A1AT SERPL-MCNC: 150 MG/DL
ALBUMIN SERPL ELPH-MCNC: 4.4 G/DL
ALP BLD-CCNC: 46 U/L
ALT SERPL-CCNC: 80 U/L
AST SERPL-CCNC: 70 U/L
BILIRUB DIRECT SERPL-MCNC: 0.1 MG/DL
BILIRUB INDIRECT SERPL-MCNC: 0.2 MG/DL
BILIRUB SERPL-MCNC: 0.3 MG/DL
CERULOPLASMIN SERPL-MCNC: 12 MG/DL
CK SERPL-CCNC: 79 U/L
FERRITIN SERPL-MCNC: 372 NG/ML
GLIADIN IGA SER QL: <5 UNITS
GLIADIN IGG SER QL: <5 UNITS
GLIADIN PEPTIDE IGA SER-ACNC: NEGATIVE
GLIADIN PEPTIDE IGG SER-ACNC: NEGATIVE
IGA SER QL IEP: 155 MG/DL
IRON SATN MFR SERPL: 23 %
IRON SERPL-MCNC: 91 UG/DL
MITOCHONDRIA AB SER IF-ACNC: NORMAL
PROT SERPL-MCNC: 6.8 G/DL
SMOOTH MUSCLE AB SER QL IF: NORMAL
TIBC SERPL-MCNC: 401 UG/DL
TTG IGA SER IA-ACNC: <1.2 U/ML
TTG IGA SER-ACNC: NEGATIVE
TTG IGG SER IA-ACNC: <1.2 U/ML
TTG IGG SER IA-ACNC: NEGATIVE
UIBC SERPL-MCNC: 310 UG/DL

## 2022-09-08 LAB
ANA SER IF-ACNC: NEGATIVE
ENDOMYSIUM IGA SER QL: NEGATIVE
ENDOMYSIUM IGA TITR SER: NORMAL

## 2022-09-09 LAB — LKM AB SER QL IF: <20.1 UNITS

## 2022-09-16 LAB — SOLUBLE LIVER IGG SER IA-ACNC: 0.9

## 2022-09-22 ENCOUNTER — APPOINTMENT (OUTPATIENT)
Dept: GASTROENTEROLOGY | Facility: CLINIC | Age: 52
End: 2022-09-22

## 2022-09-22 ENCOUNTER — LABORATORY RESULT (OUTPATIENT)
Age: 52
End: 2022-09-22

## 2022-09-22 VITALS
DIASTOLIC BLOOD PRESSURE: 87 MMHG | BODY MASS INDEX: 35.94 KG/M2 | SYSTOLIC BLOOD PRESSURE: 152 MMHG | OXYGEN SATURATION: 96 % | HEART RATE: 84 BPM | WEIGHT: 229 LBS | HEIGHT: 67 IN

## 2022-09-22 LAB
IGG SUBSET TOTAL IGG: 1010 MG/DL
IGG1 SER-MCNC: 548 MG/DL
IGG2 SER-MCNC: 316 MG/DL
IGG3 SER-MCNC: 26 MG/DL
IGG4 SER-MCNC: 22 MG/DL

## 2022-09-22 PROCEDURE — 99204 OFFICE O/P NEW MOD 45 MIN: CPT

## 2022-09-23 LAB
ALBUMIN SERPL ELPH-MCNC: 4.6 G/DL
ALP BLD-CCNC: 51 U/L
ALT SERPL-CCNC: 133 U/L
AST SERPL-CCNC: 102 U/L
BASOPHILS # BLD AUTO: 0.25 K/UL
BASOPHILS NFR BLD AUTO: 8 %
BILIRUB DIRECT SERPL-MCNC: 0.1 MG/DL
BILIRUB INDIRECT SERPL-MCNC: 0.1 MG/DL
BILIRUB SERPL-MCNC: 0.2 MG/DL
EOSINOPHIL # BLD AUTO: 0.22 K/UL
EOSINOPHIL NFR BLD AUTO: 7.1 %
HCT VFR BLD CALC: 37.1 %
HGB BLD-MCNC: 12.3 G/DL
INR PPP: 1.64 RATIO
LYMPHOCYTES # BLD AUTO: 1.67 K/UL
LYMPHOCYTES NFR BLD AUTO: 53.6 %
MAN DIFF?: NORMAL
MCHC RBC-ENTMCNC: 29.6 PG
MCHC RBC-ENTMCNC: 33.2 GM/DL
MCV RBC AUTO: 89.2 FL
MONOCYTES # BLD AUTO: 0.36 K/UL
MONOCYTES NFR BLD AUTO: 11.6 %
NEUTROPHILS # BLD AUTO: 0.56 K/UL
NEUTROPHILS NFR BLD AUTO: 15.2 %
PLATELET # BLD AUTO: 301 K/UL
PROT SERPL-MCNC: 6.9 G/DL
PT BLD: 19.1 SEC
RBC # BLD: 4.16 M/UL
RBC # FLD: 14.6 %
WBC # FLD AUTO: 3.12 K/UL

## 2022-09-24 NOTE — ASSESSMENT
[FreeTextEntry1] : Colon cancer screening\par -Negative cologuard test in 02/2020. \par repeat cologuard testing in 02/2023. \par -she will call if any questions or concerns prior to this time. \par \par Elevated aminotransferases \par started 07/2022\par AST/ALT 51-->70, ALT 59--->80\par patient provides documents of intermittent elevated aminotransferases and ferritin dating back to 2002 (See scanned records) ?a/w autoimmune disease , BLOUNT possible, however, patient has recently lost 50 lbs. \par check hemochromatosis gene , INR\par Abd US, consider Abd MRI\par refer to hepatologist,  consider liver bx\par \par

## 2022-09-24 NOTE — HISTORY OF PRESENT ILLNESS
[FreeTextEntry1] : 51 year old F PMH Ham Sx- ITP and AIHA, hypogammaglobulinemia/h/o multiple infections, SC IVIG, htn, h/o PE,  pAfib on Xarelto, asthma, hyperTAG, Hashimoto's thyroiditis, mild AQUILES, h/o nephrolithiasis , abnormal uterine bleeding, s/p D&C 2015, ortho surgeries. hyper PTH, s/p parathyroidectomy, presents for evaluation elevated liver tests. \par She is seen at the request of Dr. Sabina Link:\par Elevated liver tests\par started 07/2022\par AST/ALT 51-->70, ALT 59--->80\par \par Hep B S AB + , sAg, Cab negative. Hep C Ab negative\par DRE, ASMA, Anti LKM, AMA negative, IgG normal. \par celiac negative\par normal IgA\par CK normal. \par TSH normal. \par Ferritin 372\par % sat 23\par ceruloplasmin 12\par A1AT normal\par ESR 34. \par \par \par she reports intermitted elevated lfts and ferritin dating back to 2002. to her knowledge it has not been previously evaluated\par \par 2013 high liver tests also in 2017 when she had autoimmune attacks \par elevated in 2017 and 2019\par ferritin elevated in 2015, 2017\par \par lost 50 lbs in the last 4 years working with metabolic weight loss specialist\par no new meds in last 6 months, except for asa 81 due to slightly abnormal stress test. take 2 Tylenol 1 x per week since Dec. 2020. on Fridays or Saturdays. \par started taking tumeric, fish oil in the last 6-12 months, \par autoimmune disease not thought to be active at this time. denies alcohol, OTC supplements. \par \par Previoulsy seen for CRC screening in 07/2021- cologuard negative in 02/2020 , plan for cologuard testing in 02/2023\par She sees multiple physician- Hematology, immunology, gyn, weight loss specialist, Ophtho, urology, derm, pulmonary, cardiology, ortho\par \par Negative cologuard  02/2020\par \par No prior colonoscopy\par \par Patient denies jaundice/pruritus, abdominal pain , n/v/heartburn, reflux, dysphagia/odynophagia, change in bowel habits, diarrhea, constipation, brbpr, melena. no weight loss.  Good appetite. Patient has daily BM, denies regular NSAID use. \par \par \par father- h/o colon polyps, nonadenomatous\par mother- no known polyps\par \par no known colon cancer

## 2022-09-24 NOTE — PHYSICAL EXAM
[Alert] : alert [Normal Voice/Communication] : normal voice/communication [Healthy Appearing] : healthy appearing [No Acute Distress] : no acute distress [Normal Lips/Gums] : the lips and gums were normal [Oropharynx] : the oropharynx was normal [Normal Appearance] : the appearance of the neck was normal [No Neck Mass] : no neck mass was observed [No Respiratory Distress] : no respiratory distress [No Acc Muscle Use] : no accessory muscle use [Respiration, Rhythm And Depth] : normal respiratory rhythm and effort [Normal PMI] : the apical impulse was abnormal [Bowel Sounds] : normal bowel sounds [No Masses] : no abdominal mass palpated [Normal Color / Pigmentation] : normal skin color and pigmentation [General Appearance - Alert] : alert [General Appearance - In No Acute Distress] : in no acute distress [Sclera] : the sclera and conjunctiva were normal [Outer Ear] : the ears and nose were normal in appearance [Hearing Threshold Finger Rub Not [place]] : hearing was normal [Neck Appearance] : the appearance of the neck was normal [] : no respiratory distress [Apical Impulse] : the apical impulse was normal [Abdomen Soft] : soft [Abdomen Tenderness] : non-tender [Abnormal Walk] : normal gait [Skin Color & Pigmentation] : normal skin color and pigmentation [Oriented To Time, Place, And Person] : oriented to person, place, and time [FreeTextEntry1] : deferred

## 2022-09-27 LAB — TM INTERPRETATION: NORMAL

## 2022-09-29 ENCOUNTER — TRANSCRIPTION ENCOUNTER (OUTPATIENT)
Age: 52
End: 2022-09-29

## 2022-10-06 ENCOUNTER — APPOINTMENT (OUTPATIENT)
Dept: GASTROENTEROLOGY | Facility: CLINIC | Age: 52
End: 2022-10-06

## 2022-10-06 ENCOUNTER — RESULT REVIEW (OUTPATIENT)
Age: 52
End: 2022-10-06

## 2022-10-06 PROCEDURE — 36415 COLL VENOUS BLD VENIPUNCTURE: CPT

## 2022-10-07 LAB
INR PPP: 1.47 RATIO
PT BLD: 17.1 SEC

## 2022-10-09 LAB
ALBUMIN SERPL ELPH-MCNC: 4.5 G/DL
ALP BLD-CCNC: 51 U/L
ALT SERPL-CCNC: 76 U/L
AST SERPL-CCNC: 60 U/L
BILIRUB DIRECT SERPL-MCNC: 0.1 MG/DL
BILIRUB INDIRECT SERPL-MCNC: 0.2 MG/DL
BILIRUB SERPL-MCNC: 0.3 MG/DL
PROT SERPL-MCNC: 7 G/DL

## 2022-10-10 ENCOUNTER — APPOINTMENT (OUTPATIENT)
Dept: FAMILY MEDICINE | Facility: CLINIC | Age: 52
End: 2022-10-10

## 2022-10-10 VITALS
SYSTOLIC BLOOD PRESSURE: 160 MMHG | WEIGHT: 229 LBS | DIASTOLIC BLOOD PRESSURE: 90 MMHG | HEIGHT: 67 IN | HEART RATE: 70 BPM | BODY MASS INDEX: 35.94 KG/M2

## 2022-10-10 VITALS — DIASTOLIC BLOOD PRESSURE: 75 MMHG | SYSTOLIC BLOOD PRESSURE: 125 MMHG

## 2022-10-10 DIAGNOSIS — M54.50 LOW BACK PAIN, UNSPECIFIED: ICD-10-CM

## 2022-10-10 PROCEDURE — 99213 OFFICE O/P EST LOW 20 MIN: CPT

## 2022-10-10 RX ORDER — SEMAGLUTIDE 1.34 MG/ML
4 INJECTION, SOLUTION SUBCUTANEOUS
Qty: 9 | Refills: 0 | Status: DISCONTINUED | COMMUNITY
Start: 2022-02-09 | End: 2022-10-10

## 2022-10-10 RX ORDER — CYCLOBENZAPRINE HYDROCHLORIDE 5 MG/1
5 TABLET, FILM COATED ORAL
Qty: 20 | Refills: 0 | Status: ACTIVE | COMMUNITY
Start: 2022-10-10 | End: 1900-01-01

## 2022-10-11 PROBLEM — M54.50 ACUTE BILATERAL LOW BACK PAIN WITHOUT SCIATICA: Status: ACTIVE | Noted: 2022-10-10

## 2022-10-11 NOTE — HEALTH RISK ASSESSMENT
[Never] : Never [Yes] : Yes [Monthly or less (1 pt)] : Monthly or less (1 point) [1 or 2 (0 pts)] : 1 or 2 (0 points) [Never (0 pts)] : Never (0 points) [No] : In the past 12 months have you used drugs other than those required for medical reasons? No [No falls in past year] : Patient reported no falls in the past year [0] : 2) Feeling down, depressed, or hopeless: Not at all (0) [PHQ-2 Negative - No further assessment needed] : PHQ-2 Negative - No further assessment needed [de-identified] : rare [Audit-CScore] : 1 [de-identified] : treadmill , 4-5 x week ,walks  [de-identified] : well balanced  [ELO5Lmdal] : 0

## 2022-10-11 NOTE — HISTORY OF PRESENT ILLNESS
[FreeTextEntry8] : Ms. GLORIA TORRES is a 51 year old female here today for back pain with prolonged standing. Pain in episodic and unpredictable. Pain is in the lower back and mostly on the right side. \par Was prescribed Flexeril 10 mg.

## 2022-10-19 ENCOUNTER — APPOINTMENT (OUTPATIENT)
Dept: BARIATRICS/WEIGHT MGMT | Facility: CLINIC | Age: 52
End: 2022-10-19

## 2022-10-19 VITALS — WEIGHT: 227 LBS | BODY MASS INDEX: 35.55 KG/M2

## 2022-10-19 PROCEDURE — 99213 OFFICE O/P EST LOW 20 MIN: CPT | Mod: 95

## 2022-10-19 RX ORDER — TURMERIC ROOT EXTRACT 500 MG
TABLET ORAL
Refills: 0 | Status: DISCONTINUED | COMMUNITY
End: 2022-10-19

## 2022-10-19 NOTE — ASSESSMENT
[FreeTextEntry1] : 51 year old female with class II obesity\par Patient to continue current medications for weight loss- Contrave, metformin and Ozempic 0.5mg/week\par Reinforced healthy plate, mindful eating, healthy relationship with food, adequate hydration and exercise regimen\par Will f/u with GYN regarding vaginal bleeding\par F/U with me in 1 month

## 2022-10-19 NOTE — HISTORY OF PRESENT ILLNESS
[FreeTextEntry1] : 50 year old female referred for medical weight loss consultation.\par Patient has struggled with her weight since childhood.  Has tried many diets including WW, atkins.  Both parents struggle with their weight.  Her sister is an RD, MALVIN \par Was seeing Dr. Bryan at [place] for medical weight loss- currently taking metformin ER 500mg BID, ozempic 0.5mg/week (no known history of diabetes or prediabetes HbA1C 5.0 2021)\yvon Has treadmill and bike in her hotel room.  Going to live there for 1 year until construction on her new [place] is done\par Lost her job at [employer] during the pandemic- going to be switching insurance soon\par Breakfast daily at hot- switched from bagels/waffles to yogurt and eggs\par Wears CPAP for AQUILES \par Highest weight 5 years ago 277, current weight 245.8\par Good water intake\par Struggling to meet people in [place]- doing [employer] events \par Immunocompromised- UTD with COVID vaccination \par Not interested in bariatric surgery \par \par 12/15/21- Patient -4 pounds.  Had issue with nausea/vomiting (does not believe it was tied to the higher dose of Ozempic but d/brad the medication anyway).  Now reading to restart.  Had exercise stress test with abnormal results- nuclear stress test pending later this week.  Good hydration and sleep- wearing CPAP more.  \par \par 22- Patient doing well.  On Ozempic 0.5mg/week without negative side effect and is planning to increase next week back up to 1mg/week.  Exercising greater than 150 minutes/week- treadmill HIIT workouts, good water intake, meditation daily, wearing her CPAP, pleased with her results on recent labs with her cardiologist.  Working to increase her fruits and vegetables which has been a challenge since she is a picky eater.  Focusing on meeting most of her health goals daily.  Trying to increase plant based foods.  \par \par 3/9/22- Patient doing well.  Exercising daily.  Eating more fruits and vegetables.  Established healthy habits.  Feeling well on Ozempic 1mg/weekly- no negative side effects good appetite control  \par \par 22- -3 pounds.  Had hand surgery- now recovering- hand still wrapped.  Mindful eating, had to stop exercising after surgery now can restart.  Good fruit and vegetable intake.  Walking for exercise, recumbent bike.\par \par 6/10/22- -1.5 pounds. Feels she is losing more inches than pounds.  Highest weight in 2017 286 pounds.  Started weight lifting since osteopenia diagnosis.  +Stress over her townhouse, still living in hotel, and her cat's health.  \par \par 10/19/22- Patient doing well with weight loss -2 pounds over the past month.  +Stress regarding her move, her cat .  +vaginal bleeding last more than 10 days- h/o needing D+C a few years ago.  No dizziness.  +transaminitis seeing liver specailist next week.

## 2022-10-27 ENCOUNTER — APPOINTMENT (OUTPATIENT)
Dept: HEPATOLOGY | Facility: CLINIC | Age: 52
End: 2022-10-27

## 2022-10-27 VITALS
HEART RATE: 72 BPM | BODY MASS INDEX: 35.79 KG/M2 | SYSTOLIC BLOOD PRESSURE: 151 MMHG | TEMPERATURE: 98.6 F | WEIGHT: 228 LBS | OXYGEN SATURATION: 97 % | HEIGHT: 67 IN | DIASTOLIC BLOOD PRESSURE: 80 MMHG

## 2022-10-27 DIAGNOSIS — D69.3 IMMUNE THROMBOCYTOPENIC PURPURA: ICD-10-CM

## 2022-10-27 DIAGNOSIS — R93.3 ABNORMAL FINDINGS ON DIAGNOSTIC IMAGING OF OTHER PARTS OF DIGESTIVE TRACT: ICD-10-CM

## 2022-10-27 DIAGNOSIS — E06.3 AUTOIMMUNE THYROIDITIS: ICD-10-CM

## 2022-10-27 DIAGNOSIS — E78.1 PURE HYPERGLYCERIDEMIA: ICD-10-CM

## 2022-10-27 PROCEDURE — 99204 OFFICE O/P NEW MOD 45 MIN: CPT

## 2022-10-27 PROCEDURE — 99214 OFFICE O/P EST MOD 30 MIN: CPT

## 2022-10-27 NOTE — ASSESSMENT
[FreeTextEntry1] : \par \par \par - intermittent AST/ALT elevation, again in 2022 since July\par - obesity, lost 20 lbs of weight\par - US abdomen showed hepatomegaly, but liver otherwise normal\par - PLT >300 G/L currently. Has ITP.\par - low ceruloplasmin\par \par No medication change this year. DRE and ASMA normal. \par \par Plan: \par - bloodwork today, 24 h urine collection for Copper quantification\par - elastography\par - return in 2 months after repeat bloodwork

## 2022-10-27 NOTE — HISTORY OF PRESENT ILLNESS
[FreeTextEntry1] : Ms. TORRES is a 51 year old woman with ITP and AIHA (Ham syndrome), Hashimoto's thyoroiditis, hypogammaglobulinemia possibly as side effect of rituximab, on s.c. IVIG weekly, multiple infections, HTN, h/o PE,  pAfib on Xarelto, asthma, severe obesity, mild AQUILES, , abnormal uterine bleeding, ortho surgeries. hyper PTH, s/p parathyroidectomy, h/o nephrolithiasis, referred by Dr. Kirkpatrick from GI for further evaluation of elevated transaminases.\par AST/ALT were normal in 2/2022, then elevated since July, first 50s-60s, recently 100/130 (9/22), then !~70.\par She follows obesity medicine and lost 20 lbs since February by dieting and walking. Denies medication changes this year, other than addition of aspirin.\par She showed me an Excel sheet that shows AST > ALT elevation in 2013(only AST elevated) and 2017, AST up to 100, when she was treated for AIHA, and then ITP, and also mild elevations < 2x ULN in between, intermittently. In 2013, she was treated with rituximab and steroid, and in 2017 the same, followed by [facility]. She had four flares of AIHA, which started at age 26. \par Denies abdominal pain, fevers and chills. Has not had any significant illness this year. \par \par Alcohol history: 1 drink per month, never more. SHx: retired from [employer]. Has a dog.\par Weight history: 226 lbs, BMI 35. Lost 20 lbs in 2022 from 245 lbs. Max weight was 275 bls in 2013.\par Remedies/OTC meds: takes 2 Tylenols with her weekly infusion\par \par ROS: \par Constitutional: no fever, fatigue, weight gain/loss. Eyes: no eye pain or discharge. ENT: no nosebleed, earache, change in hearing. CVS: denies chest pain, palpitations, claudication, irregular heartbeat. Resp: denies SOB, wheezing, cough, SOB on exertion. GI: denies abdominal pain, vomiting, diarrhea, heartburn, melena. Genitourinary: denies dysuria, incontinence. Musculoskeletal: denies joint pain, joint stiffness. Integumentary: denies pruritus, skin lesions, rash. Neuro: denies confusion, dizziness, fainting. Psych: denies anxiety, depression, change in personality. Endo: denies muscle weakness. Heme/lymph: denies easy bleeding and bruising.\par \par Workup:\par - 10/27/22 US abdomen: mild hepatomegaly. Normal echotexture. Spleen nl. GB nl.\par - colonoscopy:\par \par Physical exam:\par Gen: A&Ox3, NAD. Obese.\par HEENT: normal outer ears & nose, PERRL, mucus membranes moist, anicteric, no lymphadenopathy\par CVS: regular rate and rhythm, no murmur\par Pulm: vesicular breath sounds\par Abdomen: normal bowel sounds, soft, nontender, no hepatosplenomegaly \par Legs: no edema, no clubbing\par Musculoskeletal: normal gait\par Skin: no rash\par Neuro: no asterixis, EOMI\par Psych: normal affect\par \par \par \par \par Hep B S AB + , sAg, Cab negative. Hep C Ab negative\par DRE, ASMA, Anti LKM, AMA negative, IgG normal. \par celiac negative\par normal IgA\par CK normal. \par TSH normal. \par Ferritin 372\par % sat 23\par ceruloplasmin 12\par A1AT normal\par ESR 34. \par \par \par she reports intermitted elevated lfts and ferritin dating back to 2002. to her knowledge it has not been previously evaluated\par \par 2013 high liver tests also in 2017 when she had autoimmune attacks \par elevated in 2017 and 2019\par ferritin elevated in 2015, 2017\par \par lost 50 lbs in the last 4 years working with metabolic weight loss specialist\par no new meds in last 6 months, except for asa 81 due to slightly abnormal stress test. take 2 Tylenol 1 x per week since Dec. 2020. on Fridays or Saturdays. \par started taking tumeric, fish oil in the last 6-12 months, \par autoimmune disease not thought to be active at this time. denies alcohol, OTC supplements. \par \par Previoulsy seen for CRC screening in 07/2021- cologuard negative in 02/2020 , plan for cologuard testing in 02/2023\par She sees multiple physician- Hematology, immunology, gyn, weight loss specialist, Ophtho, urology, derm, pulmonary, cardiology, ortho\par \par Negative cologuard  02/2020\par \par No prior colonoscopy\par \par Patient denies jaundice/pruritus, abdominal pain , n/v/heartburn, reflux, dysphagia/odynophagia, change in bowel habits, diarrhea, constipation, brbpr, melena. no weight loss.  Good appetite. Patient has daily BM, denies regular NSAID use. \par \par \par father- h/o colon polyps, nonadenomatous\par mother- no known polyps\par \par no known colon cancer

## 2022-10-28 ENCOUNTER — LABORATORY RESULT (OUTPATIENT)
Age: 52
End: 2022-10-28

## 2022-11-03 ENCOUNTER — LABORATORY RESULT (OUTPATIENT)
Age: 52
End: 2022-11-03

## 2022-11-10 ENCOUNTER — TRANSCRIPTION ENCOUNTER (OUTPATIENT)
Age: 52
End: 2022-11-10

## 2022-11-17 ENCOUNTER — APPOINTMENT (OUTPATIENT)
Dept: BARIATRICS/WEIGHT MGMT | Facility: CLINIC | Age: 52
End: 2022-11-17

## 2022-11-17 VITALS — WEIGHT: 226 LBS | BODY MASS INDEX: 35.4 KG/M2

## 2022-11-17 PROCEDURE — 99213 OFFICE O/P EST LOW 20 MIN: CPT | Mod: 95

## 2022-11-17 NOTE — REASON FOR VISIT
[Home] : at home, [unfilled] , at the time of the visit. [Medical Office: ([facility])___] : at the medical office located in  [Patient] : the patient [Self] : self [Follow-Up] : a follow-up visit

## 2022-11-17 NOTE — HISTORY OF PRESENT ILLNESS
[FreeTextEntry1] : 50 year old female referred for medical weight loss consultation.\par Patient has struggled with her weight since childhood.  Has tried many diets including WW, atkins.  Both parents struggle with their weight.  Her sister is an RD, MALVIN \par Was seeing Dr. Bryan at [place] for medical weight loss- currently taking metformin ER 500mg BID, ozempic 0.5mg/week (no known history of diabetes or prediabetes HbA1C 5.0 2021)\yvon Has treadmill and bike in her hotel room.  Going to live there for 1 year until construction on her new [employer] is done\par Lost her job at [employer] during the pandemic- going to be switching insurance soon\par Breakfast daily at hot- switched from bagels/waffles to yogurt and eggs\par Wears CPAP for AQUILES \par Highest weight 5 years ago 277, current weight 245.8\par Good water intake\par Struggling to meet people in [place]- doing [employer] events \par Immunocompromised- UTD with COVID vaccination \par Not interested in bariatric surgery \par \par 12/15/21- Patient -4 pounds.  Had issue with nausea/vomiting (does not believe it was tied to the higher dose of Ozempic but d/brad the medication anyway).  Now reading to restart.  Had exercise stress test with abnormal results- nuclear stress test pending later this week.  Good hydration and sleep- wearing CPAP more.  \par \par 22- Patient doing well.  On Ozempic 0.5mg/week without negative side effect and is planning to increase next week back up to 1mg/week.  Exercising greater than 150 minutes/week- treadmill HIIT workouts, good water intake, meditation daily, wearing her CPAP, pleased with her results on recent labs with her cardiologist.  Working to increase her fruits and vegetables which has been a challenge since she is a picky eater.  Focusing on meeting most of her health goals daily.  Trying to increase plant based foods.  \par \par 3/9/22- Patient doing well.  Exercising daily.  Eating more fruits and vegetables.  Established healthy habits.  Feeling well on Ozempic 1mg/weekly- no negative side effects good appetite control  \par \par 22- -3 pounds.  Had hand surgery- now recovering- hand still wrapped.  Mindful eating, had to stop exercising after surgery now can restart.  Good fruit and vegetable intake.  Walking for exercise, recumbent bike.\par \par 6/10/22- -1.5 pounds. Feels she is losing more inches than pounds.  Highest weight in 2017 286 pounds.  Started weight lifting since osteopenia diagnosis.  +Stress over her townhouse, still living in hotel, and her cat's health.  \par \par 10/19/22- Patient doing well with weight loss -2 pounds over the past month.  +Stress regarding her move, her cat .  +vaginal bleeding last more than 10 days- h/o needing D+C a few years ago.  No dizziness.  +transaminitis seeing liver specailist next week.  \par \par 22- Patient -1 pound.  -30 pounds overall.  +walking.  Moved into new house- kitchen not set up yet but did go grocery shopping.  Vaginal bleeding stopped after a few weeks, starting next cycle.  Had appointment with hepatologist.

## 2022-11-17 NOTE — ASSESSMENT
[FreeTextEntry1] : 51 year old female with class II obesity, HTN, HPL\par Continue metformin, contrave, Ozempic 0.5mg/week\par Focus on healthy cooking and mindful eating- sent [employer] cooking class/recipes\par Restart on exercise equipment once set up in her new house\par F/U 1 month

## 2022-12-05 ENCOUNTER — NON-APPOINTMENT (OUTPATIENT)
Age: 52
End: 2022-12-05

## 2022-12-21 ENCOUNTER — APPOINTMENT (OUTPATIENT)
Dept: BARIATRICS/WEIGHT MGMT | Facility: CLINIC | Age: 52
End: 2022-12-21

## 2022-12-21 VITALS — BODY MASS INDEX: 34.74 KG/M2 | WEIGHT: 221.8 LBS

## 2022-12-21 PROCEDURE — 99213 OFFICE O/P EST LOW 20 MIN: CPT | Mod: 95

## 2022-12-21 NOTE — ASSESSMENT
[FreeTextEntry1] : 52 year old female with class I obesity\par Continue metformin, contrave and Ozempic 0.5mg/week\par Will start to use air fryer, work with her sister who is an RD\par Continue exercise regimen\par Mindful eating, avoiding trigger foods\par Discussed can follow up less frequently q 3 months

## 2022-12-21 NOTE — REASON FOR VISIT
[Home] : at home, [unfilled] , at the time of the visit. [Medical Office: ([facility])___] : at the medical office located in  [Patient] : the patient [Self] : self

## 2022-12-21 NOTE — HISTORY OF PRESENT ILLNESS
[FreeTextEntry1] : 50 year old female referred for medical weight loss consultation.\par Patient has struggled with her weight since childhood.  Has tried many diets including WW, atkins.  Both parents struggle with their weight.  Her sister is an RD, MALVIN \par Was seeing Dr. Bryan at [place] for medical weight loss- currently taking metformin ER 500mg BID, ozempic 0.5mg/week (no known history of diabetes or prediabetes HbA1C 5.0 2021)\yvon Has treadmill and bike in her hotel room.  Going to live there for 1 year until construction on her new [employer] is done\par Lost her job at [employer] during the pandemic- going to be switching insurance soon\par Breakfast daily at hot- switched from bagels/waffles to yogurt and eggs\par Wears CPAP for AQUILES \par Highest weight 5 years ago 277, current weight 245.8\par Good water intake\par Struggling to meet people in [place]- doing [email or web address] events \par Immunocompromised- UTD with COVID vaccination \par Not interested in bariatric surgery \par \par 12/15/21- Patient -4 pounds.  Had issue with nausea/vomiting (does not believe it was tied to the higher dose of Ozempic but d/brad the medication anyway).  Now reading to restart.  Had exercise stress test with abnormal results- nuclear stress test pending later this week.  Good hydration and sleep- wearing CPAP more.  \par \par 22- Patient doing well.  On Ozempic 0.5mg/week without negative side effect and is planning to increase next week back up to 1mg/week.  Exercising greater than 150 minutes/week- treadmill HIIT workouts, good water intake, meditation daily, wearing her CPAP, pleased with her results on recent labs with her cardiologist.  Working to increase her fruits and vegetables which has been a challenge since she is a picky eater.  Focusing on meeting most of her health goals daily.  Trying to increase plant based foods.  \par \par 3/9/22- Patient doing well.  Exercising daily.  Eating more fruits and vegetables.  Established healthy habits.  Feeling well on Ozempic 1mg/weekly- no negative side effects good appetite control  \par \par 22- -3 pounds.  Had hand surgery- now recovering- hand still wrapped.  Mindful eating, had to stop exercising after surgery now can restart.  Good fruit and vegetable intake.  Walking for exercise, recumbent bike.\par \par 6/10/22- -1.5 pounds. Feels she is losing more inches than pounds.  Highest weight in 2017 286 pounds.  Started weight lifting since osteopenia diagnosis.  +Stress over her townhouse, still living in hotel, and her cat's health.  \par \par 10/19/22- Patient doing well with weight loss -2 pounds over the past month.  +Stress regarding her move, her cat .  +vaginal bleeding last more than 10 days- h/o needing D+C a few years ago.  No dizziness.  +transaminitis seeing liver specailist next week.  \par \par 22- Patient -1 pound.  -30 pounds overall.  +walking.  Moved into new house- kitchen not set up yet but did go grocery shopping.  Vaginal bleeding stopped after a few weeks, starting next cycle.  Had appointment with hepatologist.  \par \par 22- Patient doing well -5 pounds.  Eating yogurt and berries for breakfast, eggs or dumplings for lunch, smaller portions for dinner. Purchased air fryer, avoiding trigger foods like Swedish fish.  Taking the stairs in her townhouse.  Concerned because of upcoming insurance change.

## 2023-01-04 RX ORDER — SEMAGLUTIDE 1.34 MG/ML
2 INJECTION, SOLUTION SUBCUTANEOUS
Qty: 3 | Refills: 1 | Status: ACTIVE | COMMUNITY
Start: 1900-01-01 | End: 1900-01-01

## 2023-01-04 RX ORDER — NALTREXONE HYDROCHLORIDE AND BUPROPION HYDROCHLORIDE 8; 90 MG/1; MG/1
8-90 TABLET, EXTENDED RELEASE ORAL
Qty: 360 | Refills: 2 | Status: ACTIVE | COMMUNITY
Start: 1900-01-01 | End: 1900-01-01

## 2023-01-09 ENCOUNTER — TRANSCRIPTION ENCOUNTER (OUTPATIENT)
Age: 53
End: 2023-01-09

## 2023-01-18 DIAGNOSIS — E83.52 HYPERCALCEMIA: ICD-10-CM

## 2023-01-19 ENCOUNTER — APPOINTMENT (OUTPATIENT)
Dept: HEPATOLOGY | Facility: CLINIC | Age: 53
End: 2023-01-19
Payer: COMMERCIAL

## 2023-01-19 VITALS — HEART RATE: 77 BPM | TEMPERATURE: 97.6 F | SYSTOLIC BLOOD PRESSURE: 138 MMHG | DIASTOLIC BLOOD PRESSURE: 82 MMHG

## 2023-01-19 DIAGNOSIS — Z78.9 OTHER SPECIFIED HEALTH STATUS: ICD-10-CM

## 2023-01-19 DIAGNOSIS — K75.81 NONALCOHOLIC STEATOHEPATITIS (NASH): ICD-10-CM

## 2023-01-19 PROCEDURE — 99214 OFFICE O/P EST MOD 30 MIN: CPT

## 2023-01-19 RX ORDER — FOLIC ACID 1 MG/1
1 TABLET ORAL
Refills: 0 | Status: DISCONTINUED | COMMUNITY
Start: 2022-10-19 | End: 2023-01-19

## 2023-01-19 NOTE — ASSESSMENT
[FreeTextEntry1] : \par \par \par - intermittent AST/ALT elevation, again in 2022 since July. Likely due to BLOUNT. DD includes drug-induced liver injury (DILI) \par   Serologic workup negative in [employer] and oustide (see above).\par - obesity, lost 20 lbs of weight; follows in weight loss clinic, has been on Ozempic since 2019, had nausea with 0.5 mg/week\par - US abdomen showed hepatomegaly, but liver otherwise normal\par - no fibrosis suggested by SW elastography 11/2022\par - PLT >300 G/L currently. Has ITP.\par - low ceruloplasmin\par \par \par Plan: \par - continue efforts to lose further weight, f/u with weight loss clinic. Encouraged to try increase of Ozempic to 0.5 mg/week again, or try 0.25 mg twice a week or up to daily\par - continue current medications\par - return in 1 year after repeat bloodwork and SW elastography. She sees doctors frequently and will have her LFTs checked.

## 2023-01-19 NOTE — HISTORY OF PRESENT ILLNESS
[FreeTextEntry1] : - 1/19/23: had boodwork and SW elastography. \par She has been on Ozempic since 2019, and sees an NP, Areli Garsia, at the weight loss center.\par \par - Ms. TORRES is a 51 year old woman with ITP and AIHA (Ham syndrome), Hashimoto's thyoroiditis, hypogammaglobulinemia possibly as side effect of rituximab, on s.c. IVIG weekly, multiple infections, HTN, h/o PE,  pAfib on Xarelto, asthma, severe obesity, mild AQUILES, , abnormal uterine bleeding, ortho surgeries. hyper PTH, s/p parathyroidectomy, h/o nephrolithiasis, referred by Dr. Kirkpatrick from GI for further evaluation of elevated transaminases.\par AST/ALT were normal in 2/2022, then elevated since July, first 50s-60s, recently 100/130 (9/22), then !~70.\par She follows obesity medicine and lost 20 lbs since February by dieting and walking. Denies medication changes this year, other than addition of aspirin.\par She showed me an Excel sheet that shows AST > ALT elevation in 2013(only AST elevated) and 2017, AST up to 100, when she was treated for AIHA, and then ITP, and also mild elevations < 2x ULN in between, intermittently. In 2013, she was treated with rituximab and steroid, and in 2017 the same, followed by [facility]. She had four flares of AIHA, which started at age 26. \par Denies abdominal pain, fevers and chills. Has not had any significant illness this year. \par \par Alcohol history: 1 drink per month, never more. SHx: retired from [employer]. Has a dog.\par Weight history: 226 lbs, BMI 35. Lost 20 lbs in 2022 from 245 lbs. Max weight was 275 bls in 2013.\par Remedies/OTC meds: takes 2 Tylenols with her weekly infusion\par \par ROS: \par Constitutional: no fever, fatigue, weight gain/loss. Eyes: no eye pain or discharge. ENT: no nosebleed, earache, change in hearing. CVS: denies chest pain, palpitations, claudication, irregular heartbeat. Resp: denies SOB, wheezing, cough, SOB on exertion. GI: denies abdominal pain, vomiting, diarrhea, heartburn, melena. Genitourinary: denies dysuria, incontinence. Musculoskeletal: denies joint pain, joint stiffness. Integumentary: denies pruritus, skin lesions, rash. Neuro: denies confusion, dizziness, fainting. Psych: denies anxiety, depression, change in personality. Endo: denies muscle weakness. Heme/lymph: denies easy bleeding and bruising.\par \par Workup:\par - 11/18/23: 4.32 kPa - F0\par - 10/27/22 US abdomen: mild hepatomegaly. Normal echotexture. Spleen nl. GB nl.\par - colonoscopy:\par \par Physical exam:\par Gen: A&Ox3, NAD. Obese.\par HEENT: normal outer ears & nose, PERRL, mucus membranes moist, anicteric, no lymphadenopathy\par CVS: regular rate and rhythm, no murmur\par Pulm: vesicular breath sounds\par Abdomen: normal bowel sounds, soft, nontender, no hepatosplenomegaly \par Legs: no edema, no clubbing\par Musculoskeletal: normal gait\par Skin: no rash\par Neuro: no asterixis, EOMI\par Psych: normal affect\par \par \par \par \par Hep B S AB + , sAg, HbCab negative. Hep C Ab negative\par DRE, ASMA, Anti LKM, AMA negative, IgG normal. \par celiac negative\par normal IgA\par CK normal. \par TSH normal. \par Ferritin 372\par % sat 23\par ceruloplasmin 12\par A1AT normal\par ESR 34. \par \par \par she reports intermitted elevated lfts and ferritin dating back to 2002. to her knowledge it has not been previously evaluated\par \par 2013 high liver tests also in 2017 when she had autoimmune attacks \par elevated in 2017 and 2019\par ferritin elevated in 2015, 2017\par \par lost 50 lbs in the last 4 years working with metabolic weight loss specialist\par no new meds in last 6 months, except for asa 81 due to slightly abnormal stress test. take 2 Tylenol 1 x per week since Dec. 2020. on Fridays or Saturdays. \par started taking tumeric, fish oil in the last 6-12 months, \par autoimmune disease not thought to be active at this time. denies alcohol, OTC supplements. \par \par Previoulsy seen for CRC screening in 07/2021- cologuard negative in 02/2020 , plan for cologuard testing in 02/2023\par She sees multiple physician- Hematology, immunology, gyn, weight loss specialist, Ophtho, urology, derm, pulmonary, cardiology, ortho\par \par Negative cologuard  02/2020\par \par No prior colonoscopy\par \par Patient denies jaundice/pruritus, abdominal pain , n/v/heartburn, reflux, dysphagia/odynophagia, change in bowel habits, diarrhea, constipation, brbpr, melena. no weight loss.  Good appetite. Patient has daily BM, denies regular NSAID use. \par \par \par father- h/o colon polyps, nonadenomatous\par mother- no known polyps\par \par no known colon cancer (0) Absent

## 2023-01-25 ENCOUNTER — APPOINTMENT (OUTPATIENT)
Dept: HEMATOLOGY ONCOLOGY | Facility: CLINIC | Age: 53
End: 2023-01-25

## 2023-01-26 DIAGNOSIS — D59.10 AUTOIMMUNE HEMOLYTIC ANEMIA, UNSPECIFIED: ICD-10-CM

## 2023-02-22 ENCOUNTER — APPOINTMENT (OUTPATIENT)
Dept: PULMONOLOGY | Facility: CLINIC | Age: 53
End: 2023-02-22
Payer: COMMERCIAL

## 2023-02-22 VITALS
TEMPERATURE: 96.6 F | HEIGHT: 67 IN | HEART RATE: 87 BPM | SYSTOLIC BLOOD PRESSURE: 144 MMHG | OXYGEN SATURATION: 97 % | BODY MASS INDEX: 34.53 KG/M2 | WEIGHT: 220 LBS | DIASTOLIC BLOOD PRESSURE: 80 MMHG

## 2023-02-22 PROCEDURE — 99213 OFFICE O/P EST LOW 20 MIN: CPT

## 2023-02-22 NOTE — HISTORY OF PRESENT ILLNESS
[TextBox_4] : 52 year old female with asthma and AQUILES came for f/u.\par Last seen in 02/22\par Compliance report:\par Usage 70%\par Average use 5 hrs 5 min\par Leak 18\par AHI 0.6\par P95 6.7\par \par Asthma is well controlled. ACT 23. No exacerbations since last visit\par Sleeps OK with PAP but she would like to sleep without it\par Actively losing weight.\par Has a good mask fit\par \par

## 2023-02-25 ENCOUNTER — TRANSCRIPTION ENCOUNTER (OUTPATIENT)
Age: 53
End: 2023-02-25

## 2023-02-25 DIAGNOSIS — N83.8 OTHER NONINFLAMMATORY DISORDERS OF OVARY, FALLOPIAN TUBE AND BROAD LIGAMENT: ICD-10-CM

## 2023-03-09 ENCOUNTER — RX RENEWAL (OUTPATIENT)
Age: 53
End: 2023-03-09

## 2023-03-10 ENCOUNTER — RX RENEWAL (OUTPATIENT)
Age: 53
End: 2023-03-10

## 2023-03-10 RX ORDER — LEVOTHYROXINE SODIUM 0.07 MG/1
75 TABLET ORAL DAILY
Qty: 90 | Refills: 3 | Status: ACTIVE | COMMUNITY
Start: 2023-03-10 | End: 1900-01-01

## 2023-03-11 ENCOUNTER — TRANSCRIPTION ENCOUNTER (OUTPATIENT)
Age: 53
End: 2023-03-11

## 2023-03-15 ENCOUNTER — TRANSCRIPTION ENCOUNTER (OUTPATIENT)
Age: 53
End: 2023-03-15

## 2023-03-24 ENCOUNTER — NON-APPOINTMENT (OUTPATIENT)
Age: 53
End: 2023-03-24

## 2023-03-24 ENCOUNTER — APPOINTMENT (OUTPATIENT)
Dept: CARDIOLOGY | Facility: CLINIC | Age: 53
End: 2023-03-24
Payer: COMMERCIAL

## 2023-03-24 ENCOUNTER — RESULT REVIEW (OUTPATIENT)
Age: 53
End: 2023-03-24

## 2023-03-24 VITALS
OXYGEN SATURATION: 98 % | DIASTOLIC BLOOD PRESSURE: 80 MMHG | BODY MASS INDEX: 36.41 KG/M2 | WEIGHT: 232 LBS | HEART RATE: 76 BPM | HEIGHT: 67 IN | SYSTOLIC BLOOD PRESSURE: 136 MMHG

## 2023-03-24 PROCEDURE — 93000 ELECTROCARDIOGRAM COMPLETE: CPT

## 2023-03-24 PROCEDURE — 99214 OFFICE O/P EST MOD 30 MIN: CPT | Mod: 25

## 2023-03-25 ENCOUNTER — NON-APPOINTMENT (OUTPATIENT)
Age: 53
End: 2023-03-25

## 2023-03-25 NOTE — DISCUSSION/SUMMARY
[FreeTextEntry1] : Atherosclerotic heart disease\par Atherosclerotic heart disease is stable. A 12/21 stress sestamibi study demonstrated a small area of mild intra-apical and infra-apical ischemia.  The resting   3/23   electrocardiogram  is normal showing no  evidence of myocardial ischemia or infarction. Left ventricular systolic function is normal as reflected by a left ventricular ejection fraction of 75% on the 10/21 echocardiogram and 78% on the 12/21 gaited  sestamibi study..In view of the lack of symptoms, above noninvasive studies and clinical course continued medical management is indicated at this time. Measures to control modifiable risk factors for the development of atherosclerotic disease will be important in long-term management.\par \par I have recommended the following\par a. continue the present medical regimen \par b. no further cardiac testing at this time\par c. Risk factor modification as noted above\par \par \par \par Atrial fibrillation\par The working diagnosis is paroxysmal atrial fibrillation secondary to hypertensive -atherosclerotic  heart disease exacerbated by obesity. The history is consistent with this diagnosis. The present rate control strategy appears to be effective. An elevated "[id number]" score is indicative of an increased risk of systemic/cerebral emboli. Factor Xa. inhibitor therapy is indicated. Atrial fibrillation is expected to recur while taking the present medical regimen. The goal of treatment is control of the ventricular response and prevention of cardio embolic events.\par \par I have recommended the following\par a. Continue the present medical regimen\par b. No further cardiac testing for this problem at this time\par c. Consideration for a Zio patch/mobile telemetry study   later 2023  or 2024\par \par \par \par \par \par Hypertension\par Hypertension has reportedly been controlled while taking the present medical regimen.   In the setting of atrial fibrillation and  diabetes beta blocker and Ace-I./ARB therapy are attractive. The dose of nadolol  may be increased if required to maintain optimal levels.\par \par I have recommended the following\par a. Continue the present medical regimen\par b. Low salt low fat low cholesterol/triglycerides diabetic diet. Regular aerobic exercise and weight loss\par c. Home blood pressure monitoring\par d. Increase  nadolol dose if required to maintain optimal levels as discussed above\par \par \par \par Hyperlipidemia\par Hyperlipidemia represents a risk factor for  progressive atherosclerotic heart disease.In the setting of a diagnosis of atherosclerotic heart disease the target LDL level is  about 70. The present medical regimen has been effective.. In  3/22  the serum cholesterol level was 143  HDL 48  triglycerides 114  73 and LDL 73 .. HMG coA reductase inhibitor therapy may be added to the present medical regimen if required to maintain optimal levels  Nonpharmacological therapy specifically diet exercise and weight loss are  emphasized  as  major aspects of treatment.\par \par I have recommended the following\par a. Low salt low fat low cholesterol/triglyceride diet. Regular aerobic exercise and weight loss\par b. Continue the present medical regimen\par c. Target LDL level to about  70 as discussed above\par d  HMG coA  reductase inhibitor therapy if required to maintain optimal levels as discussed above\par \par \par \par Valvular heart disease\par Ms. Delgado carries a diagnosis of mitral valve prolapse in the past. However the 10/21 transthoracic echocardiogram  mild thickened leaflets with trace regurgitation and no prolapse.Aortic sclerosis was noted. The pulmonary artery systolic pressure was 28 mmHg.. The left ventricular  ejection fraction  was  75 %. The present cardiac physical examination is not suggestive of hemodynamic significant valvular pathology.\par \par \par I have recommended the following\par a. No further cardiac testing for this problem at this time\par \par \par \par Ventricular tachycardia\par Nonsustained ventricular tachycardia was reportedly seen on a 2014 Holter monitor. That study is not available for review. In the setting of normal left ventricular systolic function (left ventricle ejection fraction  75% 10/21 echocardiogram) the risk of the development of a malignant ventricular arrhythmia is low.\par \par I have recommended the following\par a.  no further cardiac testing for this problem at this time\par b. consideration for Zio patch/mobile telemetry study  later  2023  or 2024\par \par \par \par \par \par Obesity\par Obesity exacerbates carries cardiovascular issues. Today Ms. Delgado is 5 feet 7 inches tall and weighs 232  pounds.   She has  lost 16 pounds in the last 6 months    Continued  diet exercise and weight loss are advised.\par \par \par The diagnosis, prognosis, risks, options and alternatives were explained at length to the patient. All questions were answered. Issues discussed included  atherosclerotic heart disease  atrial fibrillation anticoagulation hypertension hyperlipidemia noninvasive cardiac testing antihypertensive agents antihyperlipidemic agents obesity diet exercise and weight loss.\par \par \par Counseling and/or coordination of care\par Time was a significant factor for this patient encounter. Total time spent with the patient was 30 minutes. 50% of the time was devoted to counseling and/or coordination of care\par \par

## 2023-03-25 NOTE — REVIEW OF SYSTEMS
[Feeling Fatigued] : feeling fatigued [Joint Pain] : joint pain [Negative] : Heme/Lymph [FreeTextEntry3] : glasses [FreeTextEntry5] : See history of present illness [FreeTextEntry8] : Menstrual blood loss

## 2023-03-25 NOTE — HISTORY OF PRESENT ILLNESS
[FreeTextEntry1] : 52-year-old female\par Routine follow-up\par Main problem has been that of excessive menstrual bleeding..  No chest pain.  No shortness of breath at rest.  No palpitations.  No syncope.

## 2023-03-29 ENCOUNTER — TRANSCRIPTION ENCOUNTER (OUTPATIENT)
Age: 53
End: 2023-03-29

## 2023-04-05 ENCOUNTER — TRANSCRIPTION ENCOUNTER (OUTPATIENT)
Age: 53
End: 2023-04-05

## 2023-04-20 ENCOUNTER — TRANSCRIPTION ENCOUNTER (OUTPATIENT)
Age: 53
End: 2023-04-20

## 2023-04-27 ENCOUNTER — APPOINTMENT (OUTPATIENT)
Dept: PODIATRY | Facility: CLINIC | Age: 53
End: 2023-04-27
Payer: COMMERCIAL

## 2023-04-27 VITALS — BODY MASS INDEX: 36.41 KG/M2 | WEIGHT: 232 LBS | HEIGHT: 67 IN

## 2023-04-27 DIAGNOSIS — S92.819S: ICD-10-CM

## 2023-04-27 PROCEDURE — 99213 OFFICE O/P EST LOW 20 MIN: CPT

## 2023-04-27 RX ORDER — LEVONORGESTREL 52 MG/1
20 INTRAUTERINE DEVICE INTRAUTERINE
Refills: 0 | Status: DISCONTINUED | COMMUNITY
Start: 2021-04-20 | End: 2023-04-27

## 2023-05-02 ENCOUNTER — NON-APPOINTMENT (OUTPATIENT)
Age: 53
End: 2023-05-02

## 2023-05-09 PROBLEM — S92.819S: Status: ACTIVE | Noted: 2021-04-19

## 2023-05-09 NOTE — PHYSICAL EXAM
[de-identified] : left foot recurrence of sesamoid pain (itiis) [Skin Color & Pigmentation] : normal skin color and pigmentation [Skin Turgor] : normal skin turgor [] : no rash [Skin Lesions] : no skin lesions [Foot Ulcer] : no foot ulcer [Skin Induration] : no skin induration

## 2023-05-09 NOTE — PROCEDURE
[FreeTextEntry1] : Based on my physical examination and my clinical findings and the patient's description of the symptoms, a complete differential diagnosis was reviewed with the patient. Possible diagnoses as well as treatment options explained in great detail. All questions asked and answered appropriately.\par .otc nsaids, orthotics modified to offload the area more as a test\par Copy of my range of motion and strengthening exercises were distributed to the patient. I also explained that the patient must do these daily as well as ice afterwards. I also advised the type of shoe gear that would both support as well as conversely aggravate this condition.\par follow up appt 4 weeks\par \par \par \par

## 2023-05-09 NOTE — REVIEW OF SYSTEMS
[As Noted in HPI] : as noted in HPI [Joint Swelling] : no joint swelling [Joint Stiffness] : no joint stiffness [Negative] : Integumentary

## 2023-05-10 ENCOUNTER — TRANSCRIPTION ENCOUNTER (OUTPATIENT)
Age: 53
End: 2023-05-10

## 2023-05-10 ENCOUNTER — RX RENEWAL (OUTPATIENT)
Age: 53
End: 2023-05-10

## 2023-05-10 RX ORDER — SEMAGLUTIDE 0.68 MG/ML
2 INJECTION, SOLUTION SUBCUTANEOUS
Qty: 3 | Refills: 1 | Status: ACTIVE | COMMUNITY
Start: 2023-05-10 | End: 1900-01-01

## 2023-05-12 ENCOUNTER — APPOINTMENT (OUTPATIENT)
Dept: ORTHOPEDIC SURGERY | Facility: CLINIC | Age: 53
End: 2023-05-12
Payer: COMMERCIAL

## 2023-05-12 ENCOUNTER — APPOINTMENT (OUTPATIENT)
Dept: PODIATRY | Facility: CLINIC | Age: 53
End: 2023-05-12

## 2023-05-12 ENCOUNTER — RESULT REVIEW (OUTPATIENT)
Age: 53
End: 2023-05-12

## 2023-05-12 VITALS — WEIGHT: 232 LBS | BODY MASS INDEX: 36.41 KG/M2 | HEIGHT: 67 IN

## 2023-05-12 DIAGNOSIS — M17.12 UNILATERAL PRIMARY OSTEOARTHRITIS, LEFT KNEE: ICD-10-CM

## 2023-05-12 PROCEDURE — 99203 OFFICE O/P NEW LOW 30 MIN: CPT

## 2023-05-12 NOTE — PHYSICAL EXAM
[de-identified] : left Knee ranges 5-115 degrees with pain and crepitus\par stable to varus, valgus, anterior and posterior stress\par neurovascularly intact distally\par no pain with hip range of motion\par negative straight leg raise\par no effusion, synovitis, or edema or erythema\par skin intact

## 2023-05-12 NOTE — DISCUSSION/SUMMARY
[de-identified] : left knee arthritis after acl years ago\par discussed weight loss, avoiding stairs, appropriate exercises and meds (cant take nsaids due to itp and afib on blood thinner)\par will try exercises and if not better will return for cortisone

## 2023-05-12 NOTE — HISTORY OF PRESENT ILLNESS
[de-identified] : six weeks left knee pain \par no trauma\par acl surgery 25 years ago\par worse w stairs and hills\par no numbness or tingling

## 2023-05-25 RX ORDER — FENOFIBRATE 145 MG/1
145 TABLET, COATED ORAL DAILY
Qty: 90 | Refills: 3 | Status: COMPLETED | COMMUNITY
Start: 2022-04-06 | End: 2023-03-09

## 2023-06-15 ENCOUNTER — APPOINTMENT (OUTPATIENT)
Dept: HEMATOLOGY ONCOLOGY | Facility: CLINIC | Age: 53
End: 2023-06-15

## 2023-06-15 ENCOUNTER — RESULT REVIEW (OUTPATIENT)
Age: 53
End: 2023-06-15

## 2023-06-15 VITALS
HEART RATE: 76 BPM | WEIGHT: 234 LBS | BODY MASS INDEX: 36.73 KG/M2 | TEMPERATURE: 97.4 F | OXYGEN SATURATION: 98 % | HEIGHT: 67 IN | SYSTOLIC BLOOD PRESSURE: 148 MMHG | RESPIRATION RATE: 16 BRPM | DIASTOLIC BLOOD PRESSURE: 83 MMHG

## 2023-07-26 ENCOUNTER — RESULT REVIEW (OUTPATIENT)
Age: 53
End: 2023-07-26

## 2023-07-26 ENCOUNTER — APPOINTMENT (OUTPATIENT)
Dept: HEMATOLOGY ONCOLOGY | Facility: CLINIC | Age: 53
End: 2023-07-26
Payer: COMMERCIAL

## 2023-07-26 VITALS
BODY MASS INDEX: 36.8 KG/M2 | DIASTOLIC BLOOD PRESSURE: 74 MMHG | OXYGEN SATURATION: 98 % | HEART RATE: 75 BPM | TEMPERATURE: 96.8 F | SYSTOLIC BLOOD PRESSURE: 139 MMHG | WEIGHT: 234.44 LBS | RESPIRATION RATE: 16 BRPM | HEIGHT: 67 IN

## 2023-07-26 DIAGNOSIS — D69.41 EVANS SYNDROME: ICD-10-CM

## 2023-07-26 DIAGNOSIS — D80.1 NONFAMILIAL HYPOGAMMAGLOBULINEMIA: ICD-10-CM

## 2023-07-26 PROCEDURE — 99214 OFFICE O/P EST MOD 30 MIN: CPT | Mod: 25

## 2023-07-26 NOTE — ASSESSMENT
[FreeTextEntry1] : # Juan Alberto's syndrome \par  Dr. Indy Esquivel [facility]- wanted a local oncologist, moved to [place] last year.  \par currently not on medication\par 7/26/23 - vs and labs reviewed. labs drawn in office today. wbc and plts wnl. hgb 11.6. check iron and ferritin given her menstrual bleeding for 3 weeks\par \par #hypogamma - From [facility] \par on xembify\par \par #PE - during hospitalization for AIHA. was flying to [place] during that time\par On xarelto 20 mg but having menstrual bleeding for ~3 weeks would recommend prophylactic dose of xarelto as this was provoked and she was on it for >6 months\par \par #afib\par nadolol\par follows with Dr. Arshad\par on xarelto. I think we can do prophylactic dose is ok with Dr. Arshad\par \par #HTN - norvasc and diovan\par follows with Dr. Arshad\par \par Will follow up in 1 year cbc with diff, cmp, immunoglobulins, iron and ferritin, b12, folate

## 2023-07-26 NOTE — HISTORY OF PRESENT ILLNESS
[de-identified] : Ms. 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 Valdez.  She is also under the care of Dr. Indy Esquivel [facility]- wanted a local oncologist, moved to [place] last year.  \par She was diagnosed in 1997 at the age of 27.  She initially presented to her PCP with petechiae and a hematoma on her arm and a peripheral blood smear showed no platelets- she was worked up at [facility].\par \par Previous treatments included- IVIG, Prednisone in 1997 \par \par She then had another episode in [id number] and repeated Prednisone and IVIG- the transitioned to Vincristine, Win-rho, Danazol. \par \par In 2013 she was in [place] for a work trip she began to not feel well, present to a primary MD with fatigue, nausea and jaundice- her Hgb was 6.7, dropped to 4.7, she received 8 units of PRBC's, Prednisone, IVIG, and subsequently Rituximab weekly x 4 then high dose Dex.  She was found to have a Pulmonary Embolus while in the ICU> Lovenox then Coumadin.  She is now on Xarelto for AFIB.  \par \par Jan 2017- another ITP attack in [facility] platelets between 3-6k- she believes this may have been triggered to a varicella vaccine in 2016.  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 Vazquez\par Hematologist Dr. Esquivel\par Dr. Arshad cardiology [de-identified] : Patient seen and examined and here today for follow up\par complains of vaginal bleeding for 3 weeks with clots\par seeing GYN\par Saw GI about elevated liver enzymes

## 2023-08-02 ENCOUNTER — APPOINTMENT (OUTPATIENT)
Dept: GASTROENTEROLOGY | Facility: CLINIC | Age: 53
End: 2023-08-02
Payer: COMMERCIAL

## 2023-08-02 VITALS
BODY MASS INDEX: 36.73 KG/M2 | HEIGHT: 67 IN | SYSTOLIC BLOOD PRESSURE: 130 MMHG | WEIGHT: 234 LBS | DIASTOLIC BLOOD PRESSURE: 80 MMHG

## 2023-08-02 DIAGNOSIS — R74.01 ELEVATION OF LEVELS OF LIVER TRANSAMINASE LEVELS: ICD-10-CM

## 2023-08-02 DIAGNOSIS — N83.202 UNSPECIFIED OVARIAN CYST, RIGHT SIDE: ICD-10-CM

## 2023-08-02 DIAGNOSIS — N83.201 UNSPECIFIED OVARIAN CYST, RIGHT SIDE: ICD-10-CM

## 2023-08-02 PROCEDURE — 99214 OFFICE O/P EST MOD 30 MIN: CPT

## 2023-08-03 PROBLEM — R74.01 TRANSAMINITIS: Status: ACTIVE | Noted: 2022-07-28

## 2023-08-10 ENCOUNTER — RESULT REVIEW (OUTPATIENT)
Age: 53
End: 2023-08-10

## 2023-08-17 NOTE — HISTORY OF PRESENT ILLNESS
[FreeTextEntry1] : 52  year old F PMH Ham Sx- ITP and AIHA, hypogammaglobulinemia/h/o multiple infections, SC IVIG, htn, h/o PE,  pAfib on Xarelto, asthma, hyperTAG, Hashimoto's thyroiditis, mild AQUILES, h/o nephrolithiasis , abnormal uterine bleeding, s/p D&C 2015, ortho surgeries. hyper PTH, s/p parathyroidectomy, presents for evaluation elevated liver tests.   prior bowel pattern was normal.  since March 2023, large , difficult hard stools, less frequent every 1-2 days   a/w perianal bulging. she had 1 episode of rectal bleeding.  started a probiotic/prebiotic-"regular girl" lasted until June, no hard ones since then.  since June intermittent diarrhea  x 2 days, then normal for 2 days, then loose stools. drinking water and gatorade zero 1 diet snapple per day weight and appetite stable. now 229 lbs down from 280 lbs 5-6 years ago. trying for slow weight loss  she has had irreg vag bleeding, had US, showed a left ovary lesion, then had another US- nothing seen, IUD removed 2/6, she had pelvic mri- ?endometriosis she had bx of endometrium- normal.  She is due for repeat Pelvic US  prior hx She is seen at the request of Dr. Sabina Link: Elevated liver tests started 07/2022 AST/ALT 51-->70, ALT 59--->80  Hep B S AB + , sAg, Cab negative. Hep C Ab negative DRE, ASMA, Anti LKM, AMA negative, IgG normal.  celiac negative normal IgA CK normal.  TSH normal.  Ferritin 372 % sat 23 ceruloplasmin 12 A1AT normal ESR 34.    she reports intermitted elevated lfts and ferritin dating back to 2002. to her knowledge it has not been previously evaluated  2013 high liver tests also in 2017 when she had autoimmune attacks  elevated in 2017 and 2019 ferritin elevated in 2015, 2017  lost 50 lbs in the last 4 years working with metabolic weight loss specialist no new meds in last 6 months, except for asa 81 due to slightly abnormal stress test. take 2 Tylenol 1 x per week since Dec. 2020. on Fridays or Saturdays.  started taking tumeric, fish oil in the last 6-12 months,  autoimmune disease not thought to be active at this time. denies alcohol, OTC supplements.   Previoulsy seen for CRC screening in 07/2021- cologuard negative in 02/2020 , plan for cologuard testing in 02/2023 She sees multiple physician- Hematology, immunology, gyn, weight loss specialist, Ophtho, urology, derm, pulmonary, cardiology, ortho  Negative cologuard  02/2020  No prior colonoscopy  Patient denies jaundice/pruritus, abdominal pain , n/v/heartburn, reflux, dysphagia/odynophagia, change in bowel habits, diarrhea, constipation, brbpr, melena. no weight loss.  Good appetite. Patient has daily BM, denies regular NSAID use.    father- h/o colon polyps, nonadenomatous mother- no known polyps  no known colon cancer

## 2023-08-17 NOTE — ASSESSMENT
[FreeTextEntry1] : Change in bowel pattern, rectal bleeding -colonoscopy Risks (including but not limited to sedation risks, infection, bleeding, perforation, possibility of missed lesions), benefits and alternatives to procedure, including not doing the procedure, were discussed with patient. Patient understood and agreed to proceed with colonoscopy.  Colonoscopy preparation instructions reviewed with patient. Broderick Discussed with  osman Dunaway to  hold xarelto x 2 days prior to procedure. continue asa   Colon cancer screening -Negative cologuard test in 02/2020.   Elevated aminotransferases -patient following with hepatology   cont weight loss.   Ovarian cysts- patient due for US- ordered, she has f/u with gyn scheduled

## 2023-08-17 NOTE — PHYSICAL EXAM
[Alert] : alert [Normal Voice/Communication] : normal voice/communication [Healthy Appearing] : healthy appearing [No Acute Distress] : no acute distress [Normal Lips/Gums] : the lips and gums were normal [Oropharynx] : the oropharynx was normal [Normal Appearance] : the appearance of the neck was normal [No Neck Mass] : no neck mass was observed [No Respiratory Distress] : no respiratory distress [No Acc Muscle Use] : no accessory muscle use [Respiration, Rhythm And Depth] : normal respiratory rhythm and effort [Normal PMI] : the apical impulse was abnormal [Bowel Sounds] : normal bowel sounds [No Masses] : no abdominal mass palpated [Normal Color / Pigmentation] : normal skin color and pigmentation [General Appearance - Alert] : alert [General Appearance - In No Acute Distress] : in no acute distress [Sclera] : the sclera and conjunctiva were normal [Outer Ear] : the ears and nose were normal in appearance [Hearing Threshold Finger Rub Not Forest] : hearing was normal [Neck Appearance] : the appearance of the neck was normal [] : no respiratory distress [Apical Impulse] : the apical impulse was normal [Abdomen Soft] : soft [Abdomen Tenderness] : non-tender [Abnormal Walk] : normal gait [Skin Color & Pigmentation] : normal skin color and pigmentation [Oriented To Time, Place, And Person] : oriented to person, place, and time [FreeTextEntry1] : deferred

## 2023-08-22 ENCOUNTER — NON-APPOINTMENT (OUTPATIENT)
Age: 53
End: 2023-08-22

## 2023-08-22 ENCOUNTER — APPOINTMENT (OUTPATIENT)
Dept: OBGYN | Facility: CLINIC | Age: 53
End: 2023-08-22

## 2023-08-23 DIAGNOSIS — Z12.11 ENCOUNTER FOR SCREENING FOR MALIGNANT NEOPLASM OF COLON: ICD-10-CM

## 2023-08-23 DIAGNOSIS — R19.4 CHANGE IN BOWEL HABIT: ICD-10-CM

## 2023-08-23 RX ORDER — POLYETHYLENE GLYCOL 3350 AND ELECTROLYTES WITH LEMON FLAVOR 236; 22.74; 6.74; 5.86; 2.97 G/4L; G/4L; G/4L; G/4L; G/4L
236 POWDER, FOR SOLUTION ORAL
Qty: 1 | Refills: 0 | Status: ACTIVE | COMMUNITY
Start: 2023-08-23 | End: 1900-01-01

## 2023-08-28 ENCOUNTER — RESULT REVIEW (OUTPATIENT)
Age: 53
End: 2023-08-28

## 2023-08-29 ENCOUNTER — APPOINTMENT (OUTPATIENT)
Dept: GASTROENTEROLOGY | Facility: HOSPITAL | Age: 53
End: 2023-08-29

## 2023-08-29 ENCOUNTER — RESULT REVIEW (OUTPATIENT)
Age: 53
End: 2023-08-29

## 2023-08-31 ENCOUNTER — RX RENEWAL (OUTPATIENT)
Age: 53
End: 2023-08-31

## 2023-08-31 RX ORDER — METFORMIN ER 500 MG 500 MG/1
500 TABLET ORAL
Qty: 270 | Refills: 2 | Status: ACTIVE | COMMUNITY
Start: 1900-01-01 | End: 1900-01-01

## 2023-09-11 ENCOUNTER — NON-APPOINTMENT (OUTPATIENT)
Age: 53
End: 2023-09-11

## 2023-09-12 DIAGNOSIS — Z13.820 ENCOUNTER FOR SCREENING FOR OSTEOPOROSIS: ICD-10-CM

## 2023-09-12 DIAGNOSIS — Z01.419 ENCOUNTER FOR GYNECOLOGICAL EXAMINATION (GENERAL) (ROUTINE) W/OUT ABNORMAL FINDINGS: ICD-10-CM

## 2023-09-13 ENCOUNTER — APPOINTMENT (OUTPATIENT)
Dept: OBGYN | Facility: CLINIC | Age: 53
End: 2023-09-13
Payer: COMMERCIAL

## 2023-09-13 VITALS
DIASTOLIC BLOOD PRESSURE: 80 MMHG | SYSTOLIC BLOOD PRESSURE: 130 MMHG | BODY MASS INDEX: 36.1 KG/M2 | WEIGHT: 230 LBS | HEIGHT: 67 IN

## 2023-09-13 DIAGNOSIS — R92.2 INCONCLUSIVE MAMMOGRAM: ICD-10-CM

## 2023-09-13 DIAGNOSIS — N83.209 UNSPECIFIED OVARIAN CYST, UNSPECIFIED SIDE: ICD-10-CM

## 2023-09-13 DIAGNOSIS — Z12.31 ENCOUNTER FOR SCREENING MAMMOGRAM FOR MALIGNANT NEOPLASM OF BREAST: ICD-10-CM

## 2023-09-13 LAB
CANCER AG125 SERPL-ACNC: 8 U/ML
CANCER AG19-9 SERPL-ACNC: 8 U/ML

## 2023-09-13 PROCEDURE — 99203 OFFICE O/P NEW LOW 30 MIN: CPT

## 2023-09-14 LAB — CEA SERPL-MCNC: <0.6 NG/ML

## 2023-09-15 PROBLEM — N83.209 CYST OF OVARY, UNSPECIFIED LATERALITY: Status: ACTIVE | Noted: 2023-09-15

## 2023-09-16 LAB — HE4X: 51.1 PMOL/L

## 2023-09-22 ENCOUNTER — APPOINTMENT (OUTPATIENT)
Dept: CARDIOLOGY | Facility: CLINIC | Age: 53
End: 2023-09-22
Payer: COMMERCIAL

## 2023-09-22 VITALS
SYSTOLIC BLOOD PRESSURE: 164 MMHG | HEIGHT: 67 IN | DIASTOLIC BLOOD PRESSURE: 101 MMHG | OXYGEN SATURATION: 95 % | HEART RATE: 92 BPM | BODY MASS INDEX: 36.26 KG/M2 | WEIGHT: 231 LBS

## 2023-09-22 DIAGNOSIS — E78.5 HYPERLIPIDEMIA, UNSPECIFIED: ICD-10-CM

## 2023-09-22 PROCEDURE — 99214 OFFICE O/P EST MOD 30 MIN: CPT

## 2023-09-23 PROBLEM — E78.5 HYPERLIPIDEMIA: Status: ACTIVE | Noted: 2021-09-10

## 2023-10-11 ENCOUNTER — APPOINTMENT (OUTPATIENT)
Dept: INTERNAL MEDICINE | Facility: CLINIC | Age: 53
End: 2023-10-11
Payer: COMMERCIAL

## 2023-10-11 VITALS
DIASTOLIC BLOOD PRESSURE: 86 MMHG | OXYGEN SATURATION: 98 % | HEART RATE: 104 BPM | HEIGHT: 67 IN | WEIGHT: 229 LBS | SYSTOLIC BLOOD PRESSURE: 168 MMHG | BODY MASS INDEX: 35.94 KG/M2

## 2023-10-11 DIAGNOSIS — Z00.00 ENCOUNTER FOR GENERAL ADULT MEDICAL EXAMINATION W/OUT ABNORMAL FINDINGS: ICD-10-CM

## 2023-10-11 PROCEDURE — 99396 PREV VISIT EST AGE 40-64: CPT | Mod: 25

## 2023-10-11 RX ORDER — NALTREXONE HYDROCHLORIDE AND BUPROPION HYDROCHLORIDE 8; 90 MG/1; MG/1
TABLET, EXTENDED RELEASE ORAL
Refills: 0 | Status: DISCONTINUED | COMMUNITY
End: 2023-10-11

## 2023-10-12 ENCOUNTER — TRANSCRIPTION ENCOUNTER (OUTPATIENT)
Age: 53
End: 2023-10-12

## 2023-10-12 LAB
CHOLEST SERPL-MCNC: 134 MG/DL
HDLC SERPL-MCNC: 47 MG/DL
LDLC SERPL CALC-MCNC: 65 MG/DL
NONHDLC SERPL-MCNC: 87 MG/DL
TRIGL SERPL-MCNC: 124 MG/DL
TSH SERPL-ACNC: 2.31 UIU/ML

## 2023-11-07 ENCOUNTER — TRANSCRIPTION ENCOUNTER (OUTPATIENT)
Age: 53
End: 2023-11-07

## 2023-11-07 RX ORDER — ALBUTEROL SULFATE 90 UG/1
108 (90 BASE) INHALANT RESPIRATORY (INHALATION)
Qty: 1 | Refills: 4 | Status: ACTIVE | COMMUNITY
Start: 2021-04-12 | End: 1900-01-01

## 2023-11-12 ENCOUNTER — RX RENEWAL (OUTPATIENT)
Age: 53
End: 2023-11-12

## 2023-11-12 RX ORDER — VALSARTAN 320 MG/1
320 TABLET, COATED ORAL DAILY
Qty: 90 | Refills: 3 | Status: ACTIVE | COMMUNITY
Start: 2023-01-03 | End: 1900-01-01

## 2023-11-12 RX ORDER — AMLODIPINE BESYLATE 10 MG/1
10 TABLET ORAL DAILY
Qty: 90 | Refills: 3 | Status: ACTIVE | COMMUNITY
Start: 2023-01-03 | End: 1900-01-01

## 2023-11-15 ENCOUNTER — RESULT REVIEW (OUTPATIENT)
Age: 53
End: 2023-11-15

## 2023-11-15 ENCOUNTER — APPOINTMENT (OUTPATIENT)
Dept: PULMONOLOGY | Facility: CLINIC | Age: 53
End: 2023-11-15
Payer: COMMERCIAL

## 2023-11-15 VITALS
BODY MASS INDEX: 37.04 KG/M2 | WEIGHT: 236 LBS | HEIGHT: 67 IN | DIASTOLIC BLOOD PRESSURE: 88 MMHG | OXYGEN SATURATION: 98 % | HEART RATE: 76 BPM | SYSTOLIC BLOOD PRESSURE: 174 MMHG

## 2023-11-15 PROCEDURE — 99214 OFFICE O/P EST MOD 30 MIN: CPT

## 2023-11-15 RX ORDER — PREDNISONE 20 MG/1
20 TABLET ORAL DAILY
Qty: 10 | Refills: 0 | Status: ACTIVE | COMMUNITY
Start: 2023-11-15 | End: 1900-01-01

## 2023-11-15 RX ORDER — BUDESONIDE AND FORMOTEROL FUMARATE DIHYDRATE 160; 4.5 UG/1; UG/1
160-4.5 AEROSOL RESPIRATORY (INHALATION) TWICE DAILY
Qty: 1 | Refills: 5 | Status: ACTIVE | COMMUNITY
Start: 2023-11-15 | End: 1900-01-01

## 2023-11-18 ENCOUNTER — RX RENEWAL (OUTPATIENT)
Age: 53
End: 2023-11-18

## 2023-11-18 RX ORDER — NADOLOL 80 MG/1
80 TABLET ORAL DAILY
Qty: 90 | Refills: 3 | Status: ACTIVE | COMMUNITY
Start: 2023-01-03 | End: 1900-01-01

## 2023-11-29 ENCOUNTER — RESULT REVIEW (OUTPATIENT)
Age: 53
End: 2023-11-29

## 2023-12-07 ENCOUNTER — APPOINTMENT (OUTPATIENT)
Dept: PULMONOLOGY | Facility: CLINIC | Age: 53
End: 2023-12-07

## 2023-12-07 ENCOUNTER — APPOINTMENT (OUTPATIENT)
Dept: PULMONOLOGY | Facility: CLINIC | Age: 53
End: 2023-12-07
Payer: COMMERCIAL

## 2023-12-07 VITALS
HEIGHT: 67 IN | WEIGHT: 236 LBS | OXYGEN SATURATION: 97 % | BODY MASS INDEX: 37.04 KG/M2 | HEART RATE: 83 BPM | TEMPERATURE: 96.3 F | SYSTOLIC BLOOD PRESSURE: 176 MMHG | DIASTOLIC BLOOD PRESSURE: 85 MMHG

## 2023-12-07 DIAGNOSIS — J45.909 UNSPECIFIED ASTHMA, UNCOMPLICATED: ICD-10-CM

## 2023-12-07 DIAGNOSIS — G47.33 OBSTRUCTIVE SLEEP APNEA (ADULT) (PEDIATRIC): ICD-10-CM

## 2023-12-07 PROCEDURE — 99214 OFFICE O/P EST MOD 30 MIN: CPT

## 2024-01-17 ENCOUNTER — TRANSCRIPTION ENCOUNTER (OUTPATIENT)
Age: 54
End: 2024-01-17

## 2024-01-31 ENCOUNTER — TRANSCRIPTION ENCOUNTER (OUTPATIENT)
Age: 54
End: 2024-01-31

## 2024-02-05 ENCOUNTER — TRANSCRIPTION ENCOUNTER (OUTPATIENT)
Age: 54
End: 2024-02-05

## 2024-02-14 ENCOUNTER — TRANSCRIPTION ENCOUNTER (OUTPATIENT)
Age: 54
End: 2024-02-14

## 2024-02-14 DIAGNOSIS — Z87.42 PERSONAL HISTORY OF OTHER DISEASES OF THE FEMALE GENITAL TRACT: ICD-10-CM

## 2024-02-14 DIAGNOSIS — N83.209 UNSPECIFIED OVARIAN CYST, UNSPECIFIED SIDE: ICD-10-CM

## 2024-02-15 ENCOUNTER — APPOINTMENT (OUTPATIENT)
Dept: PULMONOLOGY | Facility: CLINIC | Age: 54
End: 2024-02-15

## 2024-02-23 ENCOUNTER — APPOINTMENT (OUTPATIENT)
Dept: PULMONOLOGY | Facility: CLINIC | Age: 54
End: 2024-02-23

## 2024-02-28 ENCOUNTER — RESULT REVIEW (OUTPATIENT)
Age: 54
End: 2024-02-28

## 2024-03-01 ENCOUNTER — NON-APPOINTMENT (OUTPATIENT)
Age: 54
End: 2024-03-01

## 2024-03-12 ENCOUNTER — APPOINTMENT (OUTPATIENT)
Dept: OBGYN | Facility: CLINIC | Age: 54
End: 2024-03-12
Payer: COMMERCIAL

## 2024-03-12 VITALS
WEIGHT: 242 LBS | HEIGHT: 67 IN | BODY MASS INDEX: 37.98 KG/M2 | DIASTOLIC BLOOD PRESSURE: 80 MMHG | SYSTOLIC BLOOD PRESSURE: 150 MMHG

## 2024-03-12 DIAGNOSIS — Z01.419 ENCOUNTER FOR GYNECOLOGICAL EXAMINATION (GENERAL) (ROUTINE) W/OUT ABNORMAL FINDINGS: ICD-10-CM

## 2024-03-12 PROCEDURE — 99396 PREV VISIT EST AGE 40-64: CPT

## 2024-03-12 NOTE — HISTORY OF PRESENT ILLNESS
[TextBox_4] : 53yo P0 here for annual gyn.    Previously had some irreg bleeding but that has stopped and her periods have normalized.  She denies hot flashes and wants to talk about memopause treatment options. Would not be able to take HRT b/c of h/o afib.  She is retired from [employer].  She lives alone and has family in this area as she grew up here.  Has some environmental stressors b/c the [facility] she has moved into has multiple construction issues.

## 2024-03-12 NOTE — PHYSICAL EXAM
[Chaperone Declined] : Patient declined chaperone [Alert] : alert [Appropriately responsive] : appropriately responsive [No Lymphadenopathy] : no lymphadenopathy [No Acute Distress] : no acute distress [Soft] : soft [Non-tender] : non-tender [Non-distended] : non-distended [No HSM] : No HSM [No Lesions] : no lesions [No Mass] : no mass [Oriented x3] : oriented x3 [Examination Of The Breasts] : a normal appearance [No Masses] : no breast masses were palpable [Labia Majora] : normal [Labia Minora] : normal [Normal] : normal [FreeTextEntry1] : vulvar varicosities [Uterine Adnexae] : normal

## 2024-03-12 NOTE — COUNSELING
[Breast Self Exam] : breast self exam [Nutrition/ Exercise/ Weight Management] : nutrition, exercise, weight management

## 2024-03-13 ENCOUNTER — APPOINTMENT (OUTPATIENT)
Dept: CARDIOLOGY | Facility: CLINIC | Age: 54
End: 2024-03-13
Payer: COMMERCIAL

## 2024-03-13 DIAGNOSIS — I48.0 PAROXYSMAL ATRIAL FIBRILLATION: ICD-10-CM

## 2024-03-13 LAB — HPV HIGH+LOW RISK DNA PNL CVX: NOT DETECTED

## 2024-03-13 PROCEDURE — 93306 TTE W/DOPPLER COMPLETE: CPT

## 2024-03-14 LAB — CYTOLOGY CVX/VAG DOC THIN PREP: NORMAL

## 2024-03-15 DIAGNOSIS — Z86.79 PERSONAL HISTORY OF OTHER DISEASES OF THE CIRCULATORY SYSTEM: ICD-10-CM

## 2024-03-21 ENCOUNTER — RX RENEWAL (OUTPATIENT)
Age: 54
End: 2024-03-21

## 2024-03-21 RX ORDER — FENOFIBRATE 145 MG/1
145 TABLET, COATED ORAL
Qty: 90 | Refills: 3 | Status: ACTIVE | COMMUNITY
Start: 2023-01-03 | End: 1900-01-01

## 2024-03-22 ENCOUNTER — NON-APPOINTMENT (OUTPATIENT)
Age: 54
End: 2024-03-22

## 2024-03-22 ENCOUNTER — APPOINTMENT (OUTPATIENT)
Dept: CARDIOLOGY | Facility: CLINIC | Age: 54
End: 2024-03-22
Payer: COMMERCIAL

## 2024-03-22 VITALS
BODY MASS INDEX: 38.06 KG/M2 | WEIGHT: 243 LBS | OXYGEN SATURATION: 98 % | HEART RATE: 74 BPM | DIASTOLIC BLOOD PRESSURE: 86 MMHG | SYSTOLIC BLOOD PRESSURE: 147 MMHG

## 2024-03-22 DIAGNOSIS — I47.29 OTHER VENTRICULAR TACHYCARDIA: ICD-10-CM

## 2024-03-22 DIAGNOSIS — I38 ENDOCARDITIS, VALVE UNSPECIFIED: ICD-10-CM

## 2024-03-22 DIAGNOSIS — E66.9 OBESITY, UNSPECIFIED: ICD-10-CM

## 2024-03-22 DIAGNOSIS — I10 ESSENTIAL (PRIMARY) HYPERTENSION: ICD-10-CM

## 2024-03-22 DIAGNOSIS — I48.0 PAROXYSMAL ATRIAL FIBRILLATION: ICD-10-CM

## 2024-03-22 DIAGNOSIS — I25.10 ATHEROSCLEROTIC HEART DISEASE OF NATIVE CORONARY ARTERY W/OUT ANGINA PECTORIS: ICD-10-CM

## 2024-03-22 PROCEDURE — 99215 OFFICE O/P EST HI 40 MIN: CPT | Mod: 25

## 2024-03-22 PROCEDURE — 93000 ELECTROCARDIOGRAM COMPLETE: CPT

## 2024-03-22 RX ORDER — EPINEPHRINE 0.3 MG/.3ML
0.3 INJECTION INTRAMUSCULAR
Refills: 0 | Status: ACTIVE | COMMUNITY
Start: 2021-11-05

## 2024-03-25 ENCOUNTER — NON-APPOINTMENT (OUTPATIENT)
Age: 54
End: 2024-03-25

## 2024-03-25 PROBLEM — I48.0 PAROXYSMAL ATRIAL FIBRILLATION: Status: ACTIVE | Noted: 2022-04-18

## 2024-03-25 PROBLEM — I10 ESSENTIAL HYPERTENSION: Status: ACTIVE | Noted: 2021-04-19

## 2024-03-25 PROBLEM — I47.29 VENTRICULAR TACHYCARDIA, PAROXYSMAL: Status: RESOLVED | Noted: 2021-03-13 | Resolved: 2021-09-10

## 2024-03-25 PROBLEM — I25.10 ATHEROSCLEROTIC HEART DISEASE: Status: ACTIVE | Noted: 2022-02-08

## 2024-03-25 PROBLEM — I38 VALVULAR HEART DISEASE: Status: ACTIVE | Noted: 2021-03-13

## 2024-03-25 PROBLEM — E66.9 OBESITY: Status: ACTIVE | Noted: 2021-03-13

## 2024-03-25 NOTE — DISCUSSION/SUMMARY
[FreeTextEntry1] : Atherosclerotic heart disease Atherosclerotic heart disease is stable. A 12/21 stress sestamibi study demonstrated a small area of mild intra-apical and infra-apical ischemia.  The resting   3/24    electrocardiogram  is normal showing no  evidence of myocardial ischemia or infarction. Left ventricular systolic function is normal as reflected by a left ventricular ejection fraction of 78% on the 12/21 gaited  sestamibi study  and 68% on the 3/24 echocardiogram.. In view of the lack of symptoms, above noninvasive studies and clinical course continued medical management is indicated at this time. Measures to control modifiable risk factors for the development of atherosclerotic disease will be important in long-term management.  I have recommended the following a. continue the present medical regimen  b. no further cardiac testing at this time c. Risk factor modification as noted above    Atrial fibrillation The working diagnosis is paroxysmal atrial fibrillation secondary to hypertensive -atherosclerotic  heart disease exacerbated by obesity. The history is consistent with this diagnosis. The present rate control strategy appears to be effective. An elevated "[id number]" score is indicative of an increased risk of systemic/cerebral emboli. Factor Xa. inhibitor therapy is indicated. Atrial fibrillation is expected to recur while taking the present medical regimen. The goal of treatment is control of the ventricular response and prevention of cardio embolic stroke. Consideration is given towards a reduction in Xarelto dose from 20 mg/day to 15 mg/day in an effort to decrease the bleeding risk.  However there have been no significant bleeding complications while taking the present medical regimen.  In view of the elevated "[id number] vascular "score in my judgment continuing the present medical regimen is indicated  I have recommended the following a. Continue the present medical regimen b. No further cardiac testing for this problem at this time c. Consideration for a Zio patch/mobile telemetry study    later 2024  or 2025     Hypertension Hypertension has reportedly been controlled while taking the present medical regimen. Elevation of blood pressure on initial examination today (147/86 mmHg) is attributed to a whitecoat effect  In the setting of atrial fibrillation and  diabetes beta blocker and Ace-I./ARB therapy are attractive. The dose of nadolol  may be increased if required to maintain optimal levels.  I have recommended the following a. Continue the present medical regimen b. Low salt low fat low cholesterol/triglycerides diabetic diet. Regular aerobic exercise and weight loss c. Home blood pressure monitoring d. Increase  nadolol dose if required to maintain optimal levels as discussed above    Hyperlipidemia Hyperlipidemia represents a risk factor for  progressive atherosclerotic heart disease.In the setting of a diagnosis of atherosclerotic heart disease the target LDL level is  about 70. The present medical regimen has been effective.. In  10/23  the serum cholesterol level was 134  HDL 47  triglycerides 124   and LDL 65 .. HMG coA reductase inhibitor therapy may be added to the present medical regimen if required to maintain optimal levels  Nonpharmacological therapy specifically diet exercise and weight loss are  emphasized  as  major aspects of treatment.  I have recommended the following a. Low salt low fat low cholesterol/triglyceride diet. Regular aerobic exercise and weight loss b. Continue the present medical regimen c. Target LDL level to about  70 as discussed above d  HMG coA  reductase inhibitor therapy if required to maintain optimal levels as discussed above    Valvular heart disease Ms. Delgaod carries a diagnosis of mitral valve prolapse in the past. However the 3/24 echocardiogram revealed a structurally normal mitral valve.  Mild mitral/tricuspid regurgitation was reported.  The pulmonary artery systolic pressure was normal at 28 mmHg.  The left ventricular ejection fraction was 68%.. The present cardiac physical examination is not suggestive of hemodynamic significant valvular pathology.   I have recommended the following a. No further cardiac testing for this problem at this time     Ventricular tachycardia Nonsustained ventricular tachycardia was reportedly seen on a 2014 Holter monitor. That study is not available for review. In the setting of normal left ventricular systolic function (left ventricle ejection fraction  68%  3/24 echocardiogram) the risk of the development of a malignant ventricular arrhythmia is low.  I have recommended the following a.  no further cardiac testing for this problem at this time b. consideration for Zio patch/mobile telemetry study   later  2024 or 2025     Obesity Obesity exacerbates carries cardiovascular issues. Today Ms. Delgado is 5 feet 7 inches tall and weighs 243   pounds. She has gained 12 pounds in the last 6 months  Diet exercise and weight loss are advised.    The diagnosis, prognosis, risks, options and alternatives were explained at length to the patient. All questions were answered. Issues discussed included  atherosclerotic heart disease  atrial fibrillation anticoagulation hypertension hyperlipidemia noninvasive cardiac testing antihypertensive agents antihyperlipidemic agents obesity diet exercise and weight loss.   Counseling and/or coordination of care Time was a significant factor for this patient encounter. Total time spent with the patient was 30 minutes. 50% of the time was devoted to counseling and/or coordination of care

## 2024-03-25 NOTE — HISTORY OF PRESENT ILLNESS
[FreeTextEntry1] : 53-year-old female 53-year-old female Routine follow-up for atrial fibrillation atherosclerotic heart disease hyperlipidemia hypertension mitral regurgitation and obesity.  "I feel good."  Patricia denies chest pain, shortness of breath, palpitations or syncope.

## 2024-03-25 NOTE — PHYSICAL EXAM
[Normal Conjunctiva] : the conjunctiva exhibited no abnormalities [Heart Sounds] : normal S1 and S2 [Heart Rate And Rhythm] : heart rate and rhythm were normal [Bowel Sounds] : normal bowel sounds [Auscultation Breath Sounds / Voice Sounds] : lungs were clear to auscultation bilaterally [Abdomen Soft] : soft [Abdomen Tenderness] : non-tender [Abnormal Walk] : normal gait [] : no rash [Affect] : the affect was normal [FreeTextEntry1] : pleasant

## 2024-06-24 ENCOUNTER — TRANSCRIPTION ENCOUNTER (OUTPATIENT)
Age: 54
End: 2024-06-24

## 2024-07-12 ENCOUNTER — TRANSCRIPTION ENCOUNTER (OUTPATIENT)
Age: 54
End: 2024-07-12

## 2024-07-12 ENCOUNTER — RX RENEWAL (OUTPATIENT)
Age: 54
End: 2024-07-12

## 2024-07-24 ENCOUNTER — RESULT REVIEW (OUTPATIENT)
Age: 54
End: 2024-07-24

## 2024-07-24 ENCOUNTER — APPOINTMENT (OUTPATIENT)
Dept: HEMATOLOGY ONCOLOGY | Facility: CLINIC | Age: 54
End: 2024-07-24
Payer: COMMERCIAL

## 2024-07-24 VITALS
DIASTOLIC BLOOD PRESSURE: 85 MMHG | SYSTOLIC BLOOD PRESSURE: 161 MMHG | RESPIRATION RATE: 16 BRPM | WEIGHT: 251.06 LBS | BODY MASS INDEX: 39.4 KG/M2 | HEART RATE: 83 BPM | TEMPERATURE: 98 F | HEIGHT: 67 IN | OXYGEN SATURATION: 98 %

## 2024-07-24 DIAGNOSIS — R58 HEMORRHAGE, NOT ELSEWHERE CLASSIFIED: ICD-10-CM

## 2024-07-24 DIAGNOSIS — D69.41 EVANS SYNDROME: ICD-10-CM

## 2024-07-24 DIAGNOSIS — D69.3 IMMUNE THROMBOCYTOPENIC PURPURA: ICD-10-CM

## 2024-07-24 DIAGNOSIS — D59.10 AUTOIMMUNE HEMOLYTIC ANEMIA, UNSPECIFIED: ICD-10-CM

## 2024-07-24 PROCEDURE — 99214 OFFICE O/P EST MOD 30 MIN: CPT | Mod: 25

## 2024-07-24 NOTE — REVIEW OF SYSTEMS
[Negative] : Allergic/Immunologic [Mucosal Pain] : mucosal pain [Cough] : cough [FreeTextEntry4] : mucosal sores that comes and goes [FreeTextEntry6] : dry cough for the last few days

## 2024-07-24 NOTE — HISTORY OF PRESENT ILLNESS
[de-identified] : Ms. 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 Valdez.  She is also under the care of Dr. Indy Esquivel [facility]- wanted a local oncologist, moved to [place] last year.  \par  She was diagnosed in 1997 at the age of 27.  She initially presented to her PCP with petechiae and a hematoma on her arm and a peripheral blood smear showed no platelets- she was worked up at [facility].\par  \par  Previous treatments included- IVIG, Prednisone in 1997 \par  \par  She then had another episode in [id number] and repeated Prednisone and IVIG- the transitioned to Vincristine, Win-rho, Danazol. \par  \par  In 2013 she was in [place] for a work trip she began to not feel well, present to a primary MD with fatigue, nausea and jaundice- her Hgb was 6.7, dropped to 4.7, she received 8 units of PRBC's, Prednisone, IVIG, and subsequently Rituximab weekly x 4 then high dose Dex.  She was found to have a Pulmonary Embolus while in the ICU> Lovenox then Coumadin.  She is now on Xarelto for AFIB.  \par  \par  Jan 2017- another ITP attack in [facility] platelets between 3-6k- she believes this may have been triggered to a varicella vaccine in 2016.  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 Vazquez\par  Hematologist Dr. Esquivel\par  Dr. Arshad cardiology [de-identified] : Patient seen and examined and here today for follow up Overall feels well States still has intermittent vaginal bleeding

## 2024-07-24 NOTE — ASSESSMENT
[FreeTextEntry1] : # Juan Alberto's syndrome   Dr. Indy Esquivel [facility]- wanted a local oncologist, moved to [place] last year.   currently not on medication 7/26/23 - vs and labs reviewed. labs drawn in office today. wbc and plts wnl. hgb 11.6. check iron and ferritin given her menstrual bleeding for 3 weeks 7/24/24 - wbc 9.76, hgb 12, plts 326. Kidney function, liver function and electrolyes wnl. iron 71, % sat 18.  #hypogamma - From [facility]  on xembify Dr. Kenyatta Aguilar - [place] recheck immunoglobulins  #PE - during hospitalization for AIHA. was flying to [place] during that time On xarelto 20 mg but having menstrual bleeding for ~3 weeks would recommend prophylactic dose of xarelto as this was provoked and she was on it for >6 months  #afib nadolol follows with Dr. Arshad on xarelto. I think we can do prophylactic dose is ok with Dr. Arshad  #HTN - norvasc and diovan follows with Dr. Arshad  # blood blisters in mouth -  this has been going on for many years will check PT/INR/PTT, VWD  Will follow up in 1 year cbc with diff, cmp, immunoglobulins, iron and ferritin, b12, folate,  PT/INR/PTT

## 2024-07-24 NOTE — ASSESSMENT
[FreeTextEntry1] : # Juan Alberto's syndrome   Dr. Indy Esquivel [facility] [facility]- wanted a local oncologist, moved to [place] last year.   currently not on medication 7/26/23 - vs and labs reviewed. labs drawn in office today. wbc and plts wnl. hgb 11.6. check iron and ferritin given her menstrual bleeding for 3 weeks 7/24/24 - wbc 9.76, hgb 12, plts 326. Kidney function, liver function and electrolyes wnl. iron 71, % sat 18.  #hypogamma - From [facility]  on xembify Dr. Kenyatta Aguilar - [place] recheck immunoglobulins  #PE - during hospitalization for AIHA. was flying to [place] during that time On xarelto 20 mg but having menstrual bleeding for ~3 weeks would recommend prophylactic dose of xarelto as this was provoked and she was on it for >6 months  #afib nadolol follows with Dr. Arshad on xarelto. I think we can do prophylactic dose is ok with Dr. Arsahd  #HTN - norvasc and diovan follows with Dr. Arshad  # blood blisters in mouth -  this has been going on for many years will check PT/INR/PTT, VWD  Will follow up in 1 year cbc with diff, cmp, immunoglobulins, iron and ferritin, b12, folate,  PT/INR/PTT

## 2024-07-24 NOTE — HISTORY OF PRESENT ILLNESS
[de-identified] : Ms. 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 Valdez.  She is also under the care of Dr. Indy Esquivel [facility]- wanted a local oncologist, moved to [place] last year.  \par  She was diagnosed in 1997 at the age of 27.  She initially presented to her PCP with petechiae and a hematoma on her arm and a peripheral blood smear showed no platelets- she was worked up at [facility].\par  \par  Previous treatments included- IVIG, Prednisone in 1997 \par  \par  She then had another episode in [id number] and repeated Prednisone and IVIG- the transitioned to Vincristine, Win-rho, Danazol. \par  \par  In 2013 she was in [place] for a work trip she began to not feel well, present to a primary MD with fatigue, nausea and jaundice- her Hgb was 6.7, dropped to 4.7, she received 8 units of PRBC's, Prednisone, IVIG, and subsequently Rituximab weekly x 4 then high dose Dex.  She was found to have a Pulmonary Embolus while in the ICU> Lovenox then Coumadin.  She is now on Xarelto for AFIB.  \par  \par  Jan 2017- another ITP attack in [facility] platelets between 3-6k- she believes this may have been triggered to a varicella vaccine in 2016.  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 Vazquez\par  Hematologist Dr. Esquivel\par  Dr. Arshad cardiology [de-identified] : Patient seen and examined and here today for follow up Overall feels well States still has intermittent vaginal bleeding

## 2024-07-30 ENCOUNTER — NON-APPOINTMENT (OUTPATIENT)
Age: 54
End: 2024-07-30

## 2024-07-31 ENCOUNTER — APPOINTMENT (OUTPATIENT)
Dept: INTERNAL MEDICINE | Facility: CLINIC | Age: 54
End: 2024-07-31
Payer: COMMERCIAL

## 2024-07-31 ENCOUNTER — TRANSCRIPTION ENCOUNTER (OUTPATIENT)
Age: 54
End: 2024-07-31

## 2024-07-31 VITALS
WEIGHT: 251 LBS | HEIGHT: 67 IN | SYSTOLIC BLOOD PRESSURE: 162 MMHG | OXYGEN SATURATION: 98 % | HEART RATE: 77 BPM | DIASTOLIC BLOOD PRESSURE: 100 MMHG | BODY MASS INDEX: 39.39 KG/M2

## 2024-07-31 DIAGNOSIS — J45.909 UNSPECIFIED ASTHMA, UNCOMPLICATED: ICD-10-CM

## 2024-07-31 DIAGNOSIS — J06.9 ACUTE UPPER RESPIRATORY INFECTION, UNSPECIFIED: ICD-10-CM

## 2024-07-31 DIAGNOSIS — E06.3 AUTOIMMUNE THYROIDITIS: ICD-10-CM

## 2024-07-31 DIAGNOSIS — D80.1 NONFAMILIAL HYPOGAMMAGLOBULINEMIA: ICD-10-CM

## 2024-07-31 PROCEDURE — G2211 COMPLEX E/M VISIT ADD ON: CPT

## 2024-07-31 PROCEDURE — 99214 OFFICE O/P EST MOD 30 MIN: CPT

## 2024-07-31 NOTE — HEALTH RISK ASSESSMENT
[Yes] : Yes [Monthly or less (1 pt)] : Monthly or less (1 point) [1 or 2 (0 pts)] : 1 or 2 (0 points) [Never (0 pts)] : Never (0 points) [No] : In the past 12 months have you used drugs other than those required for medical reasons? No [No falls in past year] : Patient reported no falls in the past year [0] : 2) Feeling down, depressed, or hopeless: Not at all (0) [PHQ-2 Negative - No further assessment needed] : PHQ-2 Negative - No further assessment needed [Never] : Never [de-identified] : rare [Audit-CScore] : 1 [de-identified] : treadmill , 2- 3 x week ,walks  [de-identified] : well balanced  [[id number]] : 0

## 2024-07-31 NOTE — HISTORY OF PRESENT ILLNESS
[de-identified] : Patient is a 52F with a history of Ham Syndrome (ITP and AIHA), Hypogammaglobulinemia, Hypertension, paroxysmal atrial fibrillation, Asthma, hypertriglyceridemia, Hashimoto's thyroiditis, mild AQUILES on CPAP, nephrolithiasis presents today in follow up and for recent viral illness  Hematology:  Dr. Indy Esquivel @[facility] Immunology:  Dr. Aliya Vazquez ([facility]) Gynecology: Now seeing Dr. Collazo Weight loss specialist: Areli Garsia NP Ophthalmology: Dr. Constance Hylton [facility], would like ophthalmology referral Urology:Dr. Daily Dermatology: Dr. Kyle James [facility], would like dermatology referral Pulmonology: Dr. Sanchez  Cardiology: Dr. Arshad Ortho: Dr. Shen Zuñiga GI: Dr. Yady Kirkpatrick Ortho: Dr. Benavides ([facility]) hepatology: Dr. Landaverde  Started getting sick last week(tues), runny nose, eyes and sneezing all resolved. cough lingering taking B6 100mg BID for nerve support

## 2024-08-12 ENCOUNTER — TRANSCRIPTION ENCOUNTER (OUTPATIENT)
Age: 54
End: 2024-08-12

## 2024-08-19 ENCOUNTER — TRANSCRIPTION ENCOUNTER (OUTPATIENT)
Age: 54
End: 2024-08-19

## 2024-09-12 ENCOUNTER — APPOINTMENT (OUTPATIENT)
Dept: PULMONOLOGY | Facility: CLINIC | Age: 54
End: 2024-09-12
Payer: COMMERCIAL

## 2024-09-12 ENCOUNTER — RESULT REVIEW (OUTPATIENT)
Age: 54
End: 2024-09-12

## 2024-09-12 VITALS
DIASTOLIC BLOOD PRESSURE: 90 MMHG | SYSTOLIC BLOOD PRESSURE: 153 MMHG | OXYGEN SATURATION: 98 % | HEART RATE: 78 BPM | WEIGHT: 251 LBS | BODY MASS INDEX: 39.39 KG/M2 | TEMPERATURE: 97 F | HEIGHT: 67 IN

## 2024-09-12 DIAGNOSIS — R05.9 COUGH, UNSPECIFIED: ICD-10-CM

## 2024-09-12 DIAGNOSIS — J45.909 UNSPECIFIED ASTHMA, UNCOMPLICATED: ICD-10-CM

## 2024-09-12 DIAGNOSIS — G47.33 OBSTRUCTIVE SLEEP APNEA (ADULT) (PEDIATRIC): ICD-10-CM

## 2024-09-12 PROCEDURE — 99214 OFFICE O/P EST MOD 30 MIN: CPT

## 2024-09-12 RX ORDER — BROMPHENIRAMINE MALEATE, PSEUDOEPHEDRINE HYDROCHLORIDE, 2; 30; 10 MG/5ML; MG/5ML; MG/5ML
30-2-10 SYRUP ORAL
Qty: 300 | Refills: 0 | Status: ACTIVE | COMMUNITY
Start: 2024-09-12 | End: 1900-01-01

## 2024-09-12 NOTE — PHYSICAL EXAM
[No Acute Distress] : no acute distress [Normal Appearance] : normal appearance [Normal S1, S2] : normal s1, s2 [No Resp Distress] : no resp distress [Clear to Auscultation Bilaterally] : clear to auscultation bilaterally [No Abnormalities] : no abnormalities [No Clubbing] : no clubbing [No Focal Deficits] : no focal deficits [Oriented x3] : oriented x3 [Normal Affect] : normal affect

## 2024-09-12 NOTE — HISTORY OF PRESENT ILLNESS
[TextBox_4] : 53 year old female with asthma and AQUILES came for f/u Compliance report reviewed: Usage 90% Average use 4 hrs 54 min Leak 11 AHI 1.3  She started to cough again in July. She thinks she has a water leak in her house. 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 No fever, chills. She started to use Symbicort again Did not take Prednisone

## 2024-09-20 ENCOUNTER — NON-APPOINTMENT (OUTPATIENT)
Age: 54
End: 2024-09-20

## 2024-09-20 ENCOUNTER — APPOINTMENT (OUTPATIENT)
Dept: CARDIOLOGY | Facility: CLINIC | Age: 54
End: 2024-09-20
Payer: COMMERCIAL

## 2024-09-20 VITALS
SYSTOLIC BLOOD PRESSURE: 139 MMHG | HEART RATE: 68 BPM | WEIGHT: 250 LBS | BODY MASS INDEX: 39.24 KG/M2 | DIASTOLIC BLOOD PRESSURE: 71 MMHG | HEIGHT: 67 IN | OXYGEN SATURATION: 99 %

## 2024-09-20 DIAGNOSIS — I47.29 OTHER VENTRICULAR TACHYCARDIA: ICD-10-CM

## 2024-09-20 DIAGNOSIS — I48.0 PAROXYSMAL ATRIAL FIBRILLATION: ICD-10-CM

## 2024-09-20 DIAGNOSIS — I38 ENDOCARDITIS, VALVE UNSPECIFIED: ICD-10-CM

## 2024-09-20 DIAGNOSIS — I25.10 ATHEROSCLEROTIC HEART DISEASE OF NATIVE CORONARY ARTERY W/OUT ANGINA PECTORIS: ICD-10-CM

## 2024-09-20 DIAGNOSIS — E66.9 OBESITY, UNSPECIFIED: ICD-10-CM

## 2024-09-20 DIAGNOSIS — I10 ESSENTIAL (PRIMARY) HYPERTENSION: ICD-10-CM

## 2024-09-20 DIAGNOSIS — E78.5 HYPERLIPIDEMIA, UNSPECIFIED: ICD-10-CM

## 2024-09-20 PROCEDURE — 93246 EXT ECG>7D<15D RECORDING: CPT

## 2024-09-20 PROCEDURE — 99214 OFFICE O/P EST MOD 30 MIN: CPT | Mod: 25

## 2024-09-20 PROCEDURE — 93000 ELECTROCARDIOGRAM COMPLETE: CPT | Mod: LT

## 2024-09-25 NOTE — DISCUSSION/SUMMARY
[FreeTextEntry1] : Atherosclerotic heart disease Atherosclerotic heart disease is stable. A 12/21 stress sestamibi study demonstrated a small area of mild intra-apical and infra-apical ischemia.  The resting   9/24    electrocardiogram  is normal showing no  evidence of myocardial ischemia or infarction. Left ventricular systolic function is normal as reflected by a left ventricular ejection fraction of 78% on the 12/21 gaited  sestamibi study  and 68% on the 3/24 echocardiogram.. In view of the lack of symptoms, above noninvasive studies and clinical course continued medical management is indicated at this time. Measures to control modifiable risk factors for the development of atherosclerotic disease will be important in long-term management.  I have recommended the following a. continue the present medical regimen  b. no further cardiac testing at this time c. Risk factor modification as noted above    Atrial fibrillation The working diagnosis is paroxysmal atrial fibrillation secondary to hypertensive -atherosclerotic  heart disease exacerbated by obesity. The history is consistent with this diagnosis. The present rate control strategy appears to be effective. An elevated "[id number]" score is indicative of an increased risk of systemic/cerebral emboli. Factor Xa. inhibitor therapy is indicated. Atrial fibrillation is expected to recur while taking the present medical regimen. The goal of treatment is control of the ventricular response and prevention of cardio embolic stroke. Consideration is given towards a reduction in Xarelto dose from 20 mg/day to 15 mg/day in an effort to decrease the bleeding risk.  However there have been no significant bleeding complications while taking the present medical regimen.  In view of the elevated "FYY1PL2 vascular "score in my judgment continuing the present medical regimen is indicated.  Palpitations may be representative of paroxysmal atrial fibrillation or an different rhythm disturbance An  electrocardiogram during symptoms would be most helpful for diagnosis.  I have recommended the following a. Continue the present medical regimen b. Zio patch 2-week/mobile telemetry study      Hypertension Hypertension has reportedly been controlled while taking the present medical regimen. Elevation of blood pressure on initial examination today (147/86 mmHg) is attributed to a whitecoat effect  In the setting of atrial fibrillation and  diabetes beta blocker and Ace-I./ARB therapy are attractive. The dose of nadolol  may be increased if required to maintain optimal levels.  I have recommended the following a. Continue the present medical regimen b. Low salt low fat low cholesterol/triglycerides diabetic diet. Regular aerobic exercise and weight loss c. Home blood pressure monitoring d. Increase  nadolol dose if required to maintain optimal levels as discussed above    Hyperlipidemia Hyperlipidemia represents a risk factor for  progressive atherosclerotic heart disease.In the setting of a diagnosis of atherosclerotic heart disease the target LDL level is  about 70. The present medical regimen has been effective.. In  10/23  the serum cholesterol level was 134  HDL 47  triglycerides 124   and LDL 65 .. HMG coA reductase inhibitor therapy may be added to the present medical regimen if required to maintain optimal levels  Nonpharmacological therapy specifically diet exercise and weight loss are  emphasized  as  major aspects of treatment.  I have recommended the following a. Low salt low fat low cholesterol/triglyceride diet. Regular aerobic exercise and weight loss b. Continue the present medical regimen c. Target LDL level to about  70 as discussed above d  HMG coA  reductase inhibitor therapy if required to maintain optimal levels as discussed above    Valvular heart disease Ms. Delgado carries a diagnosis of mitral valve prolapse in the past. However the 3/24 echocardiogram revealed a structurally normal mitral valve.  Mild mitral/tricuspid regurgitation was reported.  The pulmonary artery systolic pressure was normal at 28 mmHg.  The left ventricular ejection fraction was 68%.. The present cardiac physical examination is not suggestive of hemodynamic significant valvular pathology.   I have recommended the following a. No further cardiac testing for this problem at this time     Ventricular tachycardia Nonsustained ventricular tachycardia was reportedly seen on a 2014 Holter monitor. That study is not available for review. In the setting of normal left ventricular systolic function (left ventricle ejection fraction  68%  3/24 echocardiogram) the risk of the development of a malignant ventricular arrhythmia is low.  I have recommended the following a.  no further cardiac testing for this problem at this time b. Zio patch/mobile telemetry study        Obesity Obesity exacerbates carries cardiovascular issues. Today Ms. Delgado is 5 feet 7 inches tall and weighs 250   pounds. She has gained 7 pounds in the last 6 months  and 20 pounds over the course of the last year  Diet exercise and weight loss are advised.    The diagnosis, prognosis, risks, options and alternatives were explained at length to the patient. All questions were answered. Issues discussed included  atherosclerotic heart disease  atrial fibrillation anticoagulation hypertension hyperlipidemia noninvasive cardiac testing antihypertensive agents antihyperlipidemic agents obesity diet exercise and weight loss.   Counseling and/or coordination of care Time was a significant factor for this patient encounter. Total time spent with the patient was 30 minutes. 50% of the time was devoted to counseling and/or coordination of care

## 2024-09-25 NOTE — HISTORY OF PRESENT ILLNESS
[FreeTextEntry1] : 53-year-old female Routine follow-up for atrial fibrillation atherosclerotic heart disease hyperlipidemia hypertension mitral regurgitation and obesity.  Patricia reports episodes of palpitations.  The symptoms are not prolonged and occur randomly.  No exertional chest pain.  No syncope.

## 2024-09-25 NOTE — DISCUSSION/SUMMARY
[FreeTextEntry1] : Atherosclerotic heart disease Atherosclerotic heart disease is stable. A 12/21 stress sestamibi study demonstrated a small area of mild intra-apical and infra-apical ischemia.  The resting   9/24    electrocardiogram  is normal showing no  evidence of myocardial ischemia or infarction. Left ventricular systolic function is normal as reflected by a left ventricular ejection fraction of 78% on the 12/21 gaited  sestamibi study  and 68% on the 3/24 echocardiogram.. In view of the lack of symptoms, above noninvasive studies and clinical course continued medical management is indicated at this time. Measures to control modifiable risk factors for the development of atherosclerotic disease will be important in long-term management.  I have recommended the following a. continue the present medical regimen  b. no further cardiac testing at this time c. Risk factor modification as noted above    Atrial fibrillation The working diagnosis is paroxysmal atrial fibrillation secondary to hypertensive -atherosclerotic  heart disease exacerbated by obesity. The history is consistent with this diagnosis. The present rate control strategy appears to be effective. An elevated "[id number]" score is indicative of an increased risk of systemic/cerebral emboli. Factor Xa. inhibitor therapy is indicated. Atrial fibrillation is expected to recur while taking the present medical regimen. The goal of treatment is control of the ventricular response and prevention of cardio embolic stroke. Consideration is given towards a reduction in Xarelto dose from 20 mg/day to 15 mg/day in an effort to decrease the bleeding risk.  However there have been no significant bleeding complications while taking the present medical regimen.  In view of the elevated "LHE4VS4 vascular "score in my judgment continuing the present medical regimen is indicated.  Palpitations may be representative of paroxysmal atrial fibrillation or an different rhythm disturbance An  electrocardiogram during symptoms would be most helpful for diagnosis.  I have recommended the following a. Continue the present medical regimen b. Zio patch 2-week/mobile telemetry study      Hypertension Hypertension has reportedly been controlled while taking the present medical regimen. Elevation of blood pressure on initial examination today (147/86 mmHg) is attributed to a whitecoat effect  In the setting of atrial fibrillation and  diabetes beta blocker and Ace-I./ARB therapy are attractive. The dose of nadolol  may be increased if required to maintain optimal levels.  I have recommended the following a. Continue the present medical regimen b. Low salt low fat low cholesterol/triglycerides diabetic diet. Regular aerobic exercise and weight loss c. Home blood pressure monitoring d. Increase  nadolol dose if required to maintain optimal levels as discussed above    Hyperlipidemia Hyperlipidemia represents a risk factor for  progressive atherosclerotic heart disease.In the setting of a diagnosis of atherosclerotic heart disease the target LDL level is  about 70. The present medical regimen has been effective.. In  10/23  the serum cholesterol level was 134  HDL 47  triglycerides 124   and LDL 65 .. HMG coA reductase inhibitor therapy may be added to the present medical regimen if required to maintain optimal levels  Nonpharmacological therapy specifically diet exercise and weight loss are  emphasized  as  major aspects of treatment.  I have recommended the following a. Low salt low fat low cholesterol/triglyceride diet. Regular aerobic exercise and weight loss b. Continue the present medical regimen c. Target LDL level to about  70 as discussed above d  HMG coA  reductase inhibitor therapy if required to maintain optimal levels as discussed above    Valvular heart disease Ms. Delgado carries a diagnosis of mitral valve prolapse in the past. However the 3/24 echocardiogram revealed a structurally normal mitral valve.  Mild mitral/tricuspid regurgitation was reported.  The pulmonary artery systolic pressure was normal at 28 mmHg.  The left ventricular ejection fraction was 68%.. The present cardiac physical examination is not suggestive of hemodynamic significant valvular pathology.   I have recommended the following a. No further cardiac testing for this problem at this time     Ventricular tachycardia Nonsustained ventricular tachycardia was reportedly seen on a 2014 Holter monitor. That study is not available for review. In the setting of normal left ventricular systolic function (left ventricle ejection fraction  68%  3/24 echocardiogram) the risk of the development of a malignant ventricular arrhythmia is low.  I have recommended the following a.  no further cardiac testing for this problem at this time b. Zio patch/mobile telemetry study        Obesity Obesity exacerbates carries cardiovascular issues. Today Ms. Delgado is 5 feet 7 inches tall and weighs 250   pounds. She has gained 7 pounds in the last 6 months  and 20 pounds over the course of the last year  Diet exercise and weight loss are advised.    The diagnosis, prognosis, risks, options and alternatives were explained at length to the patient. All questions were answered. Issues discussed included  atherosclerotic heart disease  atrial fibrillation anticoagulation hypertension hyperlipidemia noninvasive cardiac testing antihypertensive agents antihyperlipidemic agents obesity diet exercise and weight loss.   Counseling and/or coordination of care Time was a significant factor for this patient encounter. Total time spent with the patient was 30 minutes. 50% of the time was devoted to counseling and/or coordination of care

## 2024-10-16 ENCOUNTER — APPOINTMENT (OUTPATIENT)
Dept: INTERNAL MEDICINE | Facility: CLINIC | Age: 54
End: 2024-10-16
Payer: COMMERCIAL

## 2024-10-16 VITALS
HEART RATE: 78 BPM | WEIGHT: 250 LBS | BODY MASS INDEX: 39.24 KG/M2 | SYSTOLIC BLOOD PRESSURE: 152 MMHG | HEIGHT: 67 IN | OXYGEN SATURATION: 97 % | DIASTOLIC BLOOD PRESSURE: 100 MMHG

## 2024-10-16 DIAGNOSIS — E66.9 OBESITY, UNSPECIFIED: ICD-10-CM

## 2024-10-16 DIAGNOSIS — M85.88 OTHER SPECIFIED DISORDERS OF BONE DENSITY AND STRUCTURE, OTHER SITE: ICD-10-CM

## 2024-10-16 DIAGNOSIS — K75.81 NONALCOHOLIC STEATOHEPATITIS (NASH): ICD-10-CM

## 2024-10-16 DIAGNOSIS — R92.30 DENSE BREASTS, UNSPECIFIED: ICD-10-CM

## 2024-10-16 DIAGNOSIS — Z12.31 ENCOUNTER FOR SCREENING MAMMOGRAM FOR MALIGNANT NEOPLASM OF BREAST: ICD-10-CM

## 2024-10-16 DIAGNOSIS — Z00.00 ENCOUNTER FOR GENERAL ADULT MEDICAL EXAMINATION W/OUT ABNORMAL FINDINGS: ICD-10-CM

## 2024-10-16 PROCEDURE — 99396 PREV VISIT EST AGE 40-64: CPT | Mod: 25

## 2024-10-16 PROCEDURE — G0447 BEHAVIOR COUNSEL OBESITY 15M: CPT | Mod: 59

## 2024-10-16 RX ORDER — TIRZEPATIDE 2.5 MG/.5ML
2.5 INJECTION, SOLUTION SUBCUTANEOUS
Qty: 1 | Refills: 0 | Status: ACTIVE | COMMUNITY
Start: 2024-10-16 | End: 1900-01-01

## 2024-10-16 RX ORDER — TIRZEPATIDE 2.5 MG/.5ML
2.5 INJECTION, SOLUTION SUBCUTANEOUS
Qty: 1 | Refills: 0 | Status: DISCONTINUED | COMMUNITY
Start: 2024-10-16 | End: 2024-10-16

## 2024-10-17 ENCOUNTER — TRANSCRIPTION ENCOUNTER (OUTPATIENT)
Age: 54
End: 2024-10-17

## 2024-10-17 DIAGNOSIS — E87.0 HYPEROSMOLALITY AND HYPERNATREMIA: ICD-10-CM

## 2024-10-17 DIAGNOSIS — E83.52 HYPERCALCEMIA: ICD-10-CM

## 2024-10-17 DIAGNOSIS — D59.10 AUTOIMMUNE HEMOLYTIC ANEMIA, UNSPECIFIED: ICD-10-CM

## 2024-10-17 LAB
ALBUMIN SERPL ELPH-MCNC: 4.5 G/DL
ALP BLD-CCNC: 53 U/L
ALT SERPL-CCNC: 38 U/L
ANION GAP SERPL CALC-SCNC: 17 MMOL/L
AST SERPL-CCNC: 38 U/L
BASOPHILS # BLD AUTO: 0.18 K/UL
BASOPHILS NFR BLD AUTO: 2.3 %
BILIRUB SERPL-MCNC: 0.2 MG/DL
BUN SERPL-MCNC: 18 MG/DL
CALCIUM SERPL-MCNC: 11 MG/DL
CHLORIDE SERPL-SCNC: 104 MMOL/L
CHOLEST SERPL-MCNC: 170 MG/DL
CO2 SERPL-SCNC: 26 MMOL/L
CREAT SERPL-MCNC: 0.74 MG/DL
EGFR: 98 ML/MIN/1.73M2
EOSINOPHIL # BLD AUTO: 0.62 K/UL
EOSINOPHIL NFR BLD AUTO: 8 %
ESTIMATED AVERAGE GLUCOSE: 105 MG/DL
GLUCOSE SERPL-MCNC: 72 MG/DL
HBA1C MFR BLD HPLC: 5.3 %
HCT VFR BLD CALC: 35.8 %
HDLC SERPL-MCNC: 48 MG/DL
HGB BLD-MCNC: 11.9 G/DL
IMM GRANULOCYTES NFR BLD AUTO: 0.3 %
LDLC SERPL CALC-MCNC: 95 MG/DL
LYMPHOCYTES # BLD AUTO: 2.62 K/UL
LYMPHOCYTES NFR BLD AUTO: 33.9 %
MAN DIFF?: NORMAL
MCHC RBC-ENTMCNC: 29.3 PG
MCHC RBC-ENTMCNC: 33.2 GM/DL
MCV RBC AUTO: 88.2 FL
MONOCYTES # BLD AUTO: 0.55 K/UL
MONOCYTES NFR BLD AUTO: 7.1 %
NEUTROPHILS # BLD AUTO: 3.73 K/UL
NEUTROPHILS NFR BLD AUTO: 48.4 %
NONHDLC SERPL-MCNC: 123 MG/DL
PLATELET # BLD AUTO: 407 K/UL
POTASSIUM SERPL-SCNC: 4.9 MMOL/L
PROT SERPL-MCNC: 7.5 G/DL
RBC # BLD: 4.06 M/UL
RBC # FLD: 14.6 %
SODIUM SERPL-SCNC: 147 MMOL/L
T4 FREE SERPL-MCNC: 1.6 NG/DL
TRIGL SERPL-MCNC: 160 MG/DL
TSH SERPL-ACNC: 2.94 UIU/ML
WBC # FLD AUTO: 7.72 K/UL

## 2024-10-18 ENCOUNTER — TRANSCRIPTION ENCOUNTER (OUTPATIENT)
Age: 54
End: 2024-10-18

## 2024-10-19 DIAGNOSIS — I48.0 PAROXYSMAL ATRIAL FIBRILLATION: ICD-10-CM

## 2024-10-19 PROCEDURE — 93248 EXT ECG>7D<15D REV&INTERPJ: CPT

## 2024-10-20 PROBLEM — Z86.79 HISTORY OF PAROXYSMAL SUPRAVENTRICULAR TACHYCARDIA: Status: RESOLVED | Noted: 2021-03-11 | Resolved: 2024-10-20

## 2024-10-23 ENCOUNTER — NON-APPOINTMENT (OUTPATIENT)
Age: 54
End: 2024-10-23

## 2024-10-23 ENCOUNTER — TRANSCRIPTION ENCOUNTER (OUTPATIENT)
Age: 54
End: 2024-10-23

## 2024-10-25 ENCOUNTER — NON-APPOINTMENT (OUTPATIENT)
Age: 54
End: 2024-10-25

## 2024-10-29 ENCOUNTER — TRANSCRIPTION ENCOUNTER (OUTPATIENT)
Age: 54
End: 2024-10-29

## 2024-10-30 ENCOUNTER — TRANSCRIPTION ENCOUNTER (OUTPATIENT)
Age: 54
End: 2024-10-30

## 2024-10-31 ENCOUNTER — TRANSCRIPTION ENCOUNTER (OUTPATIENT)
Age: 54
End: 2024-10-31

## 2024-11-01 ENCOUNTER — TRANSCRIPTION ENCOUNTER (OUTPATIENT)
Age: 54
End: 2024-11-01

## 2024-11-25 ENCOUNTER — TRANSCRIPTION ENCOUNTER (OUTPATIENT)
Age: 54
End: 2024-11-25

## 2024-12-12 ENCOUNTER — RESULT REVIEW (OUTPATIENT)
Age: 54
End: 2024-12-12

## 2024-12-16 ENCOUNTER — APPOINTMENT (OUTPATIENT)
Dept: PULMONOLOGY | Facility: CLINIC | Age: 54
End: 2024-12-16
Payer: COMMERCIAL

## 2024-12-16 VITALS
WEIGHT: 250 LBS | SYSTOLIC BLOOD PRESSURE: 153 MMHG | OXYGEN SATURATION: 97 % | HEART RATE: 73 BPM | TEMPERATURE: 94 F | HEIGHT: 67 IN | BODY MASS INDEX: 39.24 KG/M2 | DIASTOLIC BLOOD PRESSURE: 78 MMHG

## 2024-12-16 DIAGNOSIS — G47.33 OBSTRUCTIVE SLEEP APNEA (ADULT) (PEDIATRIC): ICD-10-CM

## 2024-12-16 DIAGNOSIS — J45.909 UNSPECIFIED ASTHMA, UNCOMPLICATED: ICD-10-CM

## 2024-12-16 PROCEDURE — 99214 OFFICE O/P EST MOD 30 MIN: CPT

## 2024-12-19 ENCOUNTER — TRANSCRIPTION ENCOUNTER (OUTPATIENT)
Age: 54
End: 2024-12-19

## 2024-12-23 ENCOUNTER — TRANSCRIPTION ENCOUNTER (OUTPATIENT)
Age: 54
End: 2024-12-23

## 2024-12-24 ENCOUNTER — TRANSCRIPTION ENCOUNTER (OUTPATIENT)
Age: 54
End: 2024-12-24

## 2024-12-26 ENCOUNTER — TRANSCRIPTION ENCOUNTER (OUTPATIENT)
Age: 54
End: 2024-12-26

## 2024-12-27 ENCOUNTER — TRANSCRIPTION ENCOUNTER (OUTPATIENT)
Age: 54
End: 2024-12-27

## 2024-12-30 ENCOUNTER — TRANSCRIPTION ENCOUNTER (OUTPATIENT)
Age: 54
End: 2024-12-30

## 2025-01-03 ENCOUNTER — TRANSCRIPTION ENCOUNTER (OUTPATIENT)
Age: 55
End: 2025-01-03

## 2025-02-10 ENCOUNTER — TRANSCRIPTION ENCOUNTER (OUTPATIENT)
Age: 55
End: 2025-02-10

## 2025-02-12 ENCOUNTER — TRANSCRIPTION ENCOUNTER (OUTPATIENT)
Age: 55
End: 2025-02-12

## 2025-02-26 ENCOUNTER — APPOINTMENT (OUTPATIENT)
Dept: INTERNAL MEDICINE | Facility: CLINIC | Age: 55
End: 2025-02-26
Payer: COMMERCIAL

## 2025-02-26 VITALS
BODY MASS INDEX: 39.08 KG/M2 | OXYGEN SATURATION: 98 % | HEART RATE: 89 BPM | DIASTOLIC BLOOD PRESSURE: 90 MMHG | WEIGHT: 249 LBS | HEIGHT: 67 IN | SYSTOLIC BLOOD PRESSURE: 140 MMHG

## 2025-02-26 DIAGNOSIS — R74.01 ELEVATION OF LEVELS OF LIVER TRANSAMINASE LEVELS: ICD-10-CM

## 2025-02-26 DIAGNOSIS — E66.9 OBESITY, UNSPECIFIED: ICD-10-CM

## 2025-02-26 DIAGNOSIS — E78.5 HYPERLIPIDEMIA, UNSPECIFIED: ICD-10-CM

## 2025-02-26 DIAGNOSIS — E06.3 AUTOIMMUNE THYROIDITIS: ICD-10-CM

## 2025-02-26 PROCEDURE — 99214 OFFICE O/P EST MOD 30 MIN: CPT

## 2025-02-26 PROCEDURE — G2211 COMPLEX E/M VISIT ADD ON: CPT

## 2025-03-03 ENCOUNTER — RX RENEWAL (OUTPATIENT)
Age: 55
End: 2025-03-03

## 2025-03-05 ENCOUNTER — TRANSCRIPTION ENCOUNTER (OUTPATIENT)
Age: 55
End: 2025-03-05

## 2025-03-05 ENCOUNTER — NON-APPOINTMENT (OUTPATIENT)
Age: 55
End: 2025-03-05

## 2025-03-06 ENCOUNTER — TRANSCRIPTION ENCOUNTER (OUTPATIENT)
Age: 55
End: 2025-03-06

## 2025-03-07 ENCOUNTER — APPOINTMENT (OUTPATIENT)
Dept: OBGYN | Facility: CLINIC | Age: 55
End: 2025-03-07
Payer: COMMERCIAL

## 2025-03-07 VITALS
SYSTOLIC BLOOD PRESSURE: 180 MMHG | WEIGHT: 249 LBS | DIASTOLIC BLOOD PRESSURE: 100 MMHG | HEIGHT: 67 IN | BODY MASS INDEX: 39.08 KG/M2

## 2025-03-07 DIAGNOSIS — Z82.49 FAMILY HISTORY OF ISCHEMIC HEART DISEASE AND OTHER DISEASES OF THE CIRCULATORY SYSTEM: ICD-10-CM

## 2025-03-07 DIAGNOSIS — Z91.89 OTHER SPECIFIED PERSONAL RISK FACTORS, NOT ELSEWHERE CLASSIFIED: ICD-10-CM

## 2025-03-07 DIAGNOSIS — Z80.6 FAMILY HISTORY OF LEUKEMIA: ICD-10-CM

## 2025-03-07 DIAGNOSIS — Z80.3 FAMILY HISTORY OF MALIGNANT NEOPLASM OF BREAST: ICD-10-CM

## 2025-03-07 DIAGNOSIS — Z01.419 ENCOUNTER FOR GYNECOLOGICAL EXAMINATION (GENERAL) (ROUTINE) W/OUT ABNORMAL FINDINGS: ICD-10-CM

## 2025-03-07 DIAGNOSIS — Z86.711 PERSONAL HISTORY OF PULMONARY EMBOLISM: ICD-10-CM

## 2025-03-07 PROCEDURE — 99396 PREV VISIT EST AGE 40-64: CPT

## 2025-03-07 PROCEDURE — 99459 PELVIC EXAMINATION: CPT

## 2025-03-07 RX ORDER — ASCORBIC ACID 125 MG
TABLET,CHEWABLE ORAL
Refills: 0 | Status: ACTIVE | COMMUNITY

## 2025-03-13 ENCOUNTER — TRANSCRIPTION ENCOUNTER (OUTPATIENT)
Age: 55
End: 2025-03-13

## 2025-03-18 ENCOUNTER — APPOINTMENT (OUTPATIENT)
Dept: OBGYN | Facility: CLINIC | Age: 55
End: 2025-03-18

## 2025-03-31 ENCOUNTER — APPOINTMENT (OUTPATIENT)
Dept: CARDIOLOGY | Facility: CLINIC | Age: 55
End: 2025-03-31
Payer: COMMERCIAL

## 2025-03-31 DIAGNOSIS — I48.0 PAROXYSMAL ATRIAL FIBRILLATION: ICD-10-CM

## 2025-03-31 PROCEDURE — 93306 TTE W/DOPPLER COMPLETE: CPT

## 2025-04-01 ENCOUNTER — APPOINTMENT (OUTPATIENT)
Dept: CARDIOLOGY | Facility: CLINIC | Age: 55
End: 2025-04-01
Payer: COMMERCIAL

## 2025-04-01 VITALS
OXYGEN SATURATION: 100 % | HEART RATE: 70 BPM | BODY MASS INDEX: 38.37 KG/M2 | SYSTOLIC BLOOD PRESSURE: 152 MMHG | WEIGHT: 245 LBS | DIASTOLIC BLOOD PRESSURE: 77 MMHG

## 2025-04-01 DIAGNOSIS — E66.9 OBESITY, UNSPECIFIED: ICD-10-CM

## 2025-04-01 DIAGNOSIS — I10 ESSENTIAL (PRIMARY) HYPERTENSION: ICD-10-CM

## 2025-04-01 DIAGNOSIS — I48.91 UNSPECIFIED ATRIAL FIBRILLATION: ICD-10-CM

## 2025-04-01 DIAGNOSIS — E78.5 HYPERLIPIDEMIA, UNSPECIFIED: ICD-10-CM

## 2025-04-01 DIAGNOSIS — Z78.9 OTHER SPECIFIED HEALTH STATUS: ICD-10-CM

## 2025-04-01 DIAGNOSIS — I38 ENDOCARDITIS, VALVE UNSPECIFIED: ICD-10-CM

## 2025-04-01 DIAGNOSIS — Z86.79 PERSONAL HISTORY OF OTHER DISEASES OF THE CIRCULATORY SYSTEM: ICD-10-CM

## 2025-04-01 DIAGNOSIS — I47.20 VENTRICULAR TACHYCARDIA, UNSPECIFIED: ICD-10-CM

## 2025-04-01 DIAGNOSIS — I25.10 ATHEROSCLEROTIC HEART DISEASE OF NATIVE CORONARY ARTERY W/OUT ANGINA PECTORIS: ICD-10-CM

## 2025-04-01 PROCEDURE — 99215 OFFICE O/P EST HI 40 MIN: CPT | Mod: 25

## 2025-04-01 PROCEDURE — 93000 ELECTROCARDIOGRAM COMPLETE: CPT

## 2025-04-02 ENCOUNTER — NON-APPOINTMENT (OUTPATIENT)
Age: 55
End: 2025-04-02

## 2025-04-02 ENCOUNTER — TRANSCRIPTION ENCOUNTER (OUTPATIENT)
Age: 55
End: 2025-04-02

## 2025-04-02 PROBLEM — I48.91 ATRIAL FIBRILLATION: Status: ACTIVE | Noted: 2025-04-02

## 2025-04-02 PROBLEM — I47.20 VENTRICULAR TACHYCARDIA, PAROXYSMAL: Status: RESOLVED | Noted: 2021-03-13 | Resolved: 2021-09-10

## 2025-04-02 PROBLEM — Z78.9 SOCIAL ALCOHOL USE: Status: ACTIVE | Noted: 2025-04-02

## 2025-04-02 PROBLEM — Z86.79 HISTORY OF ATRIAL FIBRILLATION: Status: RESOLVED | Noted: 2025-04-02 | Resolved: 2025-04-02

## 2025-04-17 ENCOUNTER — APPOINTMENT (OUTPATIENT)
Dept: HEART AND VASCULAR | Facility: CLINIC | Age: 55
End: 2025-04-17
Payer: COMMERCIAL

## 2025-04-17 VITALS
HEART RATE: 75 BPM | HEIGHT: 67 IN | DIASTOLIC BLOOD PRESSURE: 76 MMHG | TEMPERATURE: 97 F | WEIGHT: 245 LBS | BODY MASS INDEX: 38.45 KG/M2 | SYSTOLIC BLOOD PRESSURE: 118 MMHG | OXYGEN SATURATION: 98 % | RESPIRATION RATE: 16 BRPM

## 2025-04-17 DIAGNOSIS — I10 ESSENTIAL (PRIMARY) HYPERTENSION: ICD-10-CM

## 2025-04-17 DIAGNOSIS — I48.91 UNSPECIFIED ATRIAL FIBRILLATION: ICD-10-CM

## 2025-04-17 PROCEDURE — 99205 OFFICE O/P NEW HI 60 MIN: CPT | Mod: 25

## 2025-04-17 PROCEDURE — 93000 ELECTROCARDIOGRAM COMPLETE: CPT

## 2025-05-12 ENCOUNTER — TRANSCRIPTION ENCOUNTER (OUTPATIENT)
Age: 55
End: 2025-05-12

## 2025-05-22 ENCOUNTER — TRANSCRIPTION ENCOUNTER (OUTPATIENT)
Age: 55
End: 2025-05-22

## 2025-05-28 ENCOUNTER — TRANSCRIPTION ENCOUNTER (OUTPATIENT)
Age: 55
End: 2025-05-28

## 2025-06-09 ENCOUNTER — APPOINTMENT (OUTPATIENT)
Dept: INTERNAL MEDICINE | Facility: CLINIC | Age: 55
End: 2025-06-09
Payer: COMMERCIAL

## 2025-06-09 VITALS
DIASTOLIC BLOOD PRESSURE: 102 MMHG | RESPIRATION RATE: 16 BRPM | BODY MASS INDEX: 37.35 KG/M2 | OXYGEN SATURATION: 98 % | HEIGHT: 67 IN | SYSTOLIC BLOOD PRESSURE: 158 MMHG | TEMPERATURE: 98.1 F | HEART RATE: 91 BPM | WEIGHT: 238 LBS

## 2025-06-09 PROBLEM — R20.0 NUMBNESS OF ARM: Status: ACTIVE | Noted: 2025-06-09

## 2025-06-09 PROCEDURE — 99214 OFFICE O/P EST MOD 30 MIN: CPT | Mod: 25

## 2025-06-10 ENCOUNTER — TRANSCRIPTION ENCOUNTER (OUTPATIENT)
Age: 55
End: 2025-06-10

## 2025-06-10 LAB
ALBUMIN SERPL ELPH-MCNC: 4.5 G/DL
ALP BLD-CCNC: 59 U/L
ALT SERPL-CCNC: 35 U/L
ANION GAP SERPL CALC-SCNC: 14 MMOL/L
AST SERPL-CCNC: 36 U/L
BASOPHILS # BLD AUTO: 0.12 K/UL
BASOPHILS NFR BLD AUTO: 1.3 %
BILIRUB SERPL-MCNC: 0.3 MG/DL
BUN SERPL-MCNC: 16 MG/DL
CALCIUM SERPL-MCNC: 11.4 MG/DL
CHLORIDE SERPL-SCNC: 107 MMOL/L
CHOLEST SERPL-MCNC: 140 MG/DL
CO2 SERPL-SCNC: 21 MMOL/L
CREAT SERPL-MCNC: 0.72 MG/DL
EGFRCR SERPLBLD CKD-EPI 2021: 101 ML/MIN/1.73M2
EOSINOPHIL # BLD AUTO: 0.89 K/UL
EOSINOPHIL NFR BLD AUTO: 9.6 %
ESTIMATED AVERAGE GLUCOSE: 97 MG/DL
GLUCOSE SERPL-MCNC: 96 MG/DL
HBA1C MFR BLD HPLC: 5 %
HCT VFR BLD CALC: 39 %
HDLC SERPL-MCNC: 46 MG/DL
HGB BLD-MCNC: 12.7 G/DL
IMM GRANULOCYTES NFR BLD AUTO: 0.2 %
LDLC SERPL-MCNC: 76 MG/DL
LYMPHOCYTES # BLD AUTO: 2.97 K/UL
LYMPHOCYTES NFR BLD AUTO: 32.2 %
MAN DIFF?: NORMAL
MCHC RBC-ENTMCNC: 29.5 PG
MCHC RBC-ENTMCNC: 32.6 G/DL
MCV RBC AUTO: 90.7 FL
MONOCYTES # BLD AUTO: 0.77 K/UL
MONOCYTES NFR BLD AUTO: 8.3 %
NEUTROPHILS # BLD AUTO: 4.46 K/UL
NEUTROPHILS NFR BLD AUTO: 48.4 %
NONHDLC SERPL-MCNC: 94 MG/DL
PLATELET # BLD AUTO: 348 K/UL
POTASSIUM SERPL-SCNC: 4.4 MMOL/L
PROT SERPL-MCNC: 7.1 G/DL
RBC # BLD: 4.3 M/UL
RBC # FLD: 14.8 %
SODIUM SERPL-SCNC: 142 MMOL/L
T4 FREE SERPL-MCNC: 1.4 NG/DL
TRIGL SERPL-MCNC: 93 MG/DL
TSH SERPL-ACNC: 2.08 UIU/ML
WBC # FLD AUTO: 9.23 K/UL

## 2025-06-16 ENCOUNTER — APPOINTMENT (OUTPATIENT)
Dept: PODIATRY | Facility: CLINIC | Age: 55
End: 2025-06-16
Payer: COMMERCIAL

## 2025-06-16 ENCOUNTER — RESULT REVIEW (OUTPATIENT)
Age: 55
End: 2025-06-16

## 2025-06-16 ENCOUNTER — RX RENEWAL (OUTPATIENT)
Age: 55
End: 2025-06-16

## 2025-06-16 PROCEDURE — 99213 OFFICE O/P EST LOW 20 MIN: CPT

## 2025-06-16 PROCEDURE — L3000: CPT | Mod: LT

## 2025-07-21 ENCOUNTER — NON-APPOINTMENT (OUTPATIENT)
Age: 55
End: 2025-07-21

## 2025-07-22 ENCOUNTER — TRANSCRIPTION ENCOUNTER (OUTPATIENT)
Age: 55
End: 2025-07-22

## 2025-07-23 ENCOUNTER — RESULT REVIEW (OUTPATIENT)
Age: 55
End: 2025-07-23

## 2025-07-23 ENCOUNTER — APPOINTMENT (OUTPATIENT)
Dept: HEMATOLOGY ONCOLOGY | Facility: CLINIC | Age: 55
End: 2025-07-23
Payer: COMMERCIAL

## 2025-07-23 VITALS
WEIGHT: 245.31 LBS | RESPIRATION RATE: 16 BRPM | HEIGHT: 67 IN | HEART RATE: 74 BPM | DIASTOLIC BLOOD PRESSURE: 81 MMHG | TEMPERATURE: 97.8 F | SYSTOLIC BLOOD PRESSURE: 142 MMHG | OXYGEN SATURATION: 98 % | BODY MASS INDEX: 38.5 KG/M2

## 2025-07-23 DIAGNOSIS — R79.1 ABNORMAL COAGULATION PROFILE: ICD-10-CM

## 2025-07-23 DIAGNOSIS — I48.0 PAROXYSMAL ATRIAL FIBRILLATION: ICD-10-CM

## 2025-07-23 DIAGNOSIS — D80.1 NONFAMILIAL HYPOGAMMAGLOBULINEMIA: ICD-10-CM

## 2025-07-23 DIAGNOSIS — D69.41 EVANS SYNDROME: ICD-10-CM

## 2025-07-23 PROCEDURE — 99215 OFFICE O/P EST HI 40 MIN: CPT | Mod: 25

## 2025-07-24 ENCOUNTER — RX RENEWAL (OUTPATIENT)
Age: 55
End: 2025-07-24

## 2025-07-25 ENCOUNTER — TRANSCRIPTION ENCOUNTER (OUTPATIENT)
Age: 55
End: 2025-07-25

## 2025-07-25 DIAGNOSIS — E66.9 OBESITY, UNSPECIFIED: ICD-10-CM

## 2025-07-25 DIAGNOSIS — G47.33 OBSTRUCTIVE SLEEP APNEA (ADULT) (PEDIATRIC): ICD-10-CM

## 2025-07-25 PROBLEM — R79.1 PROLONGED INR: Status: ACTIVE | Noted: 2025-07-25

## 2025-07-25 PROBLEM — R79.1 PROLONGED PTT: Status: ACTIVE | Noted: 2025-07-25

## 2025-07-25 RX ORDER — NALTREXONE HYDROCHLORIDE AND BUPROPION HYDROCHLORIDE 8; 90 MG/1; MG/1
8-90 TABLET, EXTENDED RELEASE ORAL
Qty: 120 | Refills: 3 | Status: ACTIVE | COMMUNITY
Start: 2025-07-25 | End: 1900-01-01

## 2025-07-29 ENCOUNTER — TRANSCRIPTION ENCOUNTER (OUTPATIENT)
Age: 55
End: 2025-07-29

## 2025-08-06 ENCOUNTER — TRANSCRIPTION ENCOUNTER (OUTPATIENT)
Age: 55
End: 2025-08-06

## 2025-09-10 ENCOUNTER — APPOINTMENT (OUTPATIENT)
Dept: HEART AND VASCULAR | Facility: CLINIC | Age: 55
End: 2025-09-10

## 2025-09-17 ENCOUNTER — APPOINTMENT (OUTPATIENT)
Dept: HEART AND VASCULAR | Facility: CLINIC | Age: 55
End: 2025-09-17